# Patient Record
Sex: FEMALE | Race: WHITE | NOT HISPANIC OR LATINO | Employment: FULL TIME | URBAN - METROPOLITAN AREA
[De-identification: names, ages, dates, MRNs, and addresses within clinical notes are randomized per-mention and may not be internally consistent; named-entity substitution may affect disease eponyms.]

---

## 2017-03-01 ENCOUNTER — MEDICAL CORRESPONDENCE (OUTPATIENT)
Dept: HEALTH INFORMATION MANAGEMENT | Facility: CLINIC | Age: 37
End: 2017-03-01

## 2017-03-07 ENCOUNTER — PRE VISIT (OUTPATIENT)
Dept: CARDIOLOGY | Facility: CLINIC | Age: 37
End: 2017-03-07

## 2017-03-07 DIAGNOSIS — E78.00 HIGH CHOLESTEROL: Primary | ICD-10-CM

## 2017-03-07 NOTE — TELEPHONE ENCOUNTER
Pre visit nursing summary    HPI: Gaviota REDD Yanivpurnima was referred to clinic for High Cholesterol    Labs on 11/18/2016  Chol-285  HDL-83  C-LDL-193  Trig-48

## 2017-03-08 ENCOUNTER — OFFICE VISIT (OUTPATIENT)
Dept: CARDIOLOGY | Facility: CLINIC | Age: 37
End: 2017-03-08
Attending: INTERNAL MEDICINE
Payer: COMMERCIAL

## 2017-03-08 VITALS
WEIGHT: 122 LBS | BODY MASS INDEX: 20.83 KG/M2 | HEART RATE: 65 BPM | HEIGHT: 64 IN | SYSTOLIC BLOOD PRESSURE: 104 MMHG | DIASTOLIC BLOOD PRESSURE: 67 MMHG | OXYGEN SATURATION: 99 %

## 2017-03-08 DIAGNOSIS — E78.00 HIGH CHOLESTEROL: ICD-10-CM

## 2017-03-08 PROCEDURE — 93005 ELECTROCARDIOGRAM TRACING: CPT | Mod: ZF

## 2017-03-08 PROCEDURE — 93010 ELECTROCARDIOGRAM REPORT: CPT | Mod: ZP | Performed by: INTERNAL MEDICINE

## 2017-03-08 PROCEDURE — 99213 OFFICE O/P EST LOW 20 MIN: CPT | Performed by: INTERNAL MEDICINE

## 2017-03-08 RX ORDER — ATORVASTATIN CALCIUM 40 MG/1
40 TABLET, FILM COATED ORAL DAILY
Qty: 90 TABLET | Refills: 3 | Status: SHIPPED | OUTPATIENT
Start: 2017-03-08 | End: 2020-10-22

## 2017-03-08 ASSESSMENT — PAIN SCALES - GENERAL: PAINLEVEL: NO PAIN (0)

## 2017-03-08 NOTE — PROGRESS NOTES
2017            Lyubov Underwood MD   [address] [address]      RE: Gaviota Lopez   MRN: [id number]   : 1980      Dear Dr. Underwood:      It was a pleasure participating in the care of your patient, Ms. Gaviota Lopez.  As you know, she is a 36-year-old lady who I see today for hyperlipidemia.      PAST MEDICAL HISTORY:      1.  Hyperlipidemia.   2.  Allergic rhinitis.   3.  Irregular periods.   4.  Asthma.   5.  Depression/anxiety.   6.  Vitamin D deficiency.      She denies having a significant history of cardiac disease.      In terms of her present symptom complex, both her parents have high cholesterol and she wanted to seek an opinion regarding this.      In terms of her symptoms, she is quite active.  She works out 4-5 times a week.  She does 30 minutes of aerobic activity on the treadmill or elliptical machine and then followed by 30 minutes of weightlifting.  She denies having any significant exertional chest discomfort or exertional limitation.  She denies shortness of breath, exertional dyspnea, PND, orthopnea, edema, palpitations, syncope or near-syncope.  She is asymptomatic at a relatively high level of exertion and denies having any other significant complaints.      In terms of her cardiac risk factors, no history of diabetes or hypertension.  She does not smoke.  She drinks 1-2 drinks a month.  Parents both have hyperlipidemia but no documented coronary artery disease.  She does have hyperlipidemia herself.  She is a student studying genetic counseling.      MEDICATIONS:  In terms of her current medications, she is taking sertraline and vitamin D.      PHYSICAL EXAMINATION:     VITAL SIGNS:  Her blood pressure is 104/67 with a pulse 65.  Her weight is 122 pounds.   NECK:  Exam reveals no obvious jugular venous distention.   LUNGS:  Clear to auscultation.  Respiratory effort is normal.   CARDIAC:  Reveals a regular rate and  rhythm, no obvious murmur or gallop appreciated.   ABDOMEN:  Belly soft, nontender.   EXTREMITIES:  Without gross edema.      Labs 12/16/2016 potassium 4.6, GFR normal.  03/09/2016 hemoglobin 13.4.  11/18/2016 total cholesterol 285, , HDL 83, triglycerides 46.        EKG today normal sinus rhythm at a rate of 65 beats per minute, nonspecific T-wave changes.        IMPRESSION:      Gaviota is a 36-year-old lady without a prior documented history of cardiac disease who presents with hyperlipidemia.  Her LDL is 193 as of 11/18/2016, and according to current practice guidelines, further medical therapy would be indicated.        PLAN:     1.  Start Lipitor 40 mg a day.  She would like to follow up with you for further titration of her antihyperlipidemic regimen and will likely require another fasting lipid profile in 3-4 months.     2.  She can followup here on an as-needed basis or if further issues should arise.      Once again, it was a pleasure participating in the care of your patient, Ms. Gaviota Lopez.  Please feel free to contact me at anytime if you have questions regarding her care in the future.      Sincerely,      Tanvir Reddy MD

## 2017-03-08 NOTE — PATIENT INSTRUCTIONS
You were seen today in the Cardiovascular Clinic at the [facility].      Cardiology Providers you saw during your visit:  Dr. Verdin    Recommendations:  Start Lipitor 40 mg daily by mouth. Have your primary care provider order a repeat lipid profile lab test for for in 3-4 months.    Follow-up:  As needed with you primary care provider for refills as well.      Thank you for your visit today!       Please call if you have any questions or concerns.  Cardiology Care Coordinator  Josiane Purdy RN    For scheduling needs [phone] option 1 and the option 1 again.  Nursing questions: [phone] option 1 then chose option 3 for the triage nurse.  For emergencies call [phone].

## 2017-03-08 NOTE — MR AVS SNAPSHOT
After Visit Summary   3/8/2017    Gaviota Lopez    MRN: [id number]           Patient Information     Date Of Birth          1980        Visit Information        Provider Department      3/8/2017 8:30 AM Tanvir Verdin MD [facility]        Today's Diagnoses     High cholesterol          Care Instructions    You were seen today in the Cardiovascular Clinic at the [facility].      Cardiology Providers you saw during your visit:  Dr. Verdin    Recommendations:  Start Lipitor 40 mg daily by mouth. Have your primary care provider order a repeat lipid profile lab test for for in 3-4 months.    Follow-up:  As needed with you primary care provider for refills as well.      Thank you for your visit today!       Please call if you have any questions or concerns.  Cardiology Care Coordinator  Josiane Purdy RN    For scheduling needs [phone] option 1 and the option 1 again.  Nursing questions: [phone] option 1 then chose option 3 for the triage nurse.  For emergencies call [phone].                Follow-ups after your visit        Follow-up notes from your care team     Return if symptoms worsen or fail to improve, for CLAUDIA verdin.      Who to contact     If you have questions or need follow up information about today's clinic visit or your schedule please contact [facility] directly at [phone].  Normal or non-critical lab and imaging results will be communicated to you by MyChart, letter or phone within 4 business days after the clinic has received the results. If you do not hear from us within 7 days, please contact the clinic through MyChart or phone. If you have a critical or abnormal lab result, we will notify you by phone as soon as possible.  Submit refill requests through [employer] or call your pharmacy and they will forward the refill request to us. Please allow 3 business days for your refill to be completed.          Additional Information About Your  "Visit        myJambihart Information     [employer] lets you send messages to your doctor, view your test results, renew your prescriptions, schedule appointments and more. To sign up, go to [email or web address] . Click on \"Log in\" on the left side of the screen, which will take you to the Welcome page. Then click on \"Sign up Now\" on the right side of the page.     You will be asked to enter the access code listed below, as well as some personal information. Please follow the directions to create your username and password.     Your access code is: [email or web address]  Expires: 2017  6:31 AM     Your access code will  in 90 days. If you need help or a new code, please call your [facility] clinic or [phone].        Care EveryWhere ID     This is your Care EveryWhere ID. This could be used by other organizations to access your [facility] medical records  [id number]        Your Vitals Were     Pulse Height Pulse Oximetry BMI (Body Mass Index)          65 1.626 m (5' 4\") 99% 20.94 kg/m2         Blood Pressure from Last 3 Encounters:   17 104/67   16 113/68   03 126/66    Weight from Last 3 Encounters:   17 55.3 kg (122 lb)   16 55.2 kg (121 lb 9.6 oz)   03 49.9 kg (110 lb)              We Performed the Following     EKG 12-lead, tracing only (Future)          Today's Medication Changes          These changes are accurate as of: 3/8/17  8:53 AM.  If you have any questions, ask your nurse or doctor.               Start taking these medicines.        Dose/Directions    atorvastatin 40 MG tablet   Commonly known as:  LIPITOR   Used for:  High cholesterol        Dose:  40 mg   Take 1 tablet (40 mg) by mouth daily   Quantity:  90 tablet   Refills:  3            Where to get your medicines      These medications were sent to [facility] [place], [place] - [address] [place] [id number]     Phone:  [phone]     atorvastatin 40 MG tablet       "          Primary Care Provider    None       No address on file        Thank you!     Thank you for choosing [facility]  for your care. Our goal is always to provide you with excellent care. Hearing back from our patients is one way we can continue to improve our services. Please take a few minutes to complete the written survey that you may receive in the mail after your visit with us. Thank you!             Your Updated Medication List - Protect others around you: Learn how to safely use, store and throw away your medicines at www.disposemymeds.org.          This list is accurate as of: 3/8/17  8:53 AM.  Always use your most recent med list.                   Brand Name Dispense Instructions for use    atorvastatin 40 MG tablet    LIPITOR    90 tablet    Take 1 tablet (40 mg) by mouth daily       B-100 COMPLEX PO          VITAMIN D-3 PO          ZOLOFT 50 MG tablet   Generic drug:  sertraline      Take by mouth daily

## 2017-03-08 NOTE — LETTER
3/8/2017      RE: Gaviota Lopez  [address] [facility] [address]       Dear Colleague,    Thank you for the opportunity to participate in the care of your patient, Gaviota Lopez, at the [facility] HEART [facility]. Please see a copy of my visit note below.    2017            Lyubov Underwood MD   [address] [address]      RE: Gaviota Lopez   MRN: [id number]   : 1980      Dear Dr. Underwood:      It was a pleasure participating in the care of your patient, Ms. Gaviota Lopez.  As you know, she is a 36-year-old lady who I see today for hyperlipidemia.      PAST MEDICAL HISTORY:      1.  Hyperlipidemia.   2.  Allergic rhinitis.   3.  Irregular periods.   4.  Asthma.   5.  Depression/anxiety.   6.  Vitamin D deficiency.      She denies having a significant history of cardiac disease.      In terms of her present symptom complex, both her parents have high cholesterol and she wanted to seek an opinion regarding this.      In terms of her symptoms, she is quite active.  She works out 4-5 times a week.  She does 30 minutes of aerobic activity on the treadmill or elliptical machine and then followed by 30 minutes of weightlifting.  She denies having any significant exertional chest discomfort or exertional limitation.  She denies shortness of breath, exertional dyspnea, PND, orthopnea, edema, palpitations, syncope or near-syncope.  She is asymptomatic at a relatively high level of exertion and denies having any other significant complaints.      In terms of her cardiac risk factors, no history of diabetes or hypertension.  She does not smoke.  She drinks 1-2 drinks a month.  Parents both have hyperlipidemia but no documented coronary artery disease.  She does have hyperlipidemia herself.  She is a student studying genetic counseling.      MEDICATIONS:  In terms of her current  medications, she is taking sertraline and vitamin D.      PHYSICAL EXAMINATION:     VITAL SIGNS:  Her blood pressure is 104/67 with a pulse 65.  Her weight is 122 pounds.   NECK:  Exam reveals no obvious jugular venous distention.   LUNGS:  Clear to auscultation.  Respiratory effort is normal.   CARDIAC:  Reveals a regular rate and rhythm, no obvious murmur or gallop appreciated.   ABDOMEN:  Belly soft, nontender.   EXTREMITIES:  Without gross edema.      Labs 12/16/2016 potassium 4.6, GFR normal.  03/09/2016 hemoglobin 13.4.  11/18/2016 total cholesterol 285, , HDL 83, triglycerides 46.        EKG today normal sinus rhythm at a rate of 65 beats per minute, nonspecific T-wave changes.        IMPRESSION:      Gaviota is a 36-year-old lady without a prior documented history of cardiac disease who presents with hyperlipidemia.  Her LDL is 193 as of 11/18/2016, and according to current practice guidelines, further medical therapy would be indicated.        PLAN:     1.  Start Lipitor 40 mg a day.  She would like to follow up with you for further titration of her antihyperlipidemic regimen and will likely require another fasting lipid profile in 3-4 months.     2.  She can followup here on an as-needed basis or if further issues should arise.      Once again, it was a pleasure participating in the care of your patient, Ms. Gaviota Lopez.  Please feel free to contact me at anytime if you have questions regarding her care in the future.      Sincerely,      Tanvir Reddy MD

## 2017-03-09 LAB — INTERPRETATION ECG - MUSE: NORMAL

## 2017-06-16 ENCOUNTER — TRANSFERRED RECORDS (OUTPATIENT)
Dept: HEALTH INFORMATION MANAGEMENT | Facility: CLINIC | Age: 37
End: 2017-06-16

## 2017-10-13 ENCOUNTER — TRANSFERRED RECORDS (OUTPATIENT)
Dept: HEALTH INFORMATION MANAGEMENT | Facility: CLINIC | Age: 37
End: 2017-10-13

## 2017-10-17 ENCOUNTER — TRANSFERRED RECORDS (OUTPATIENT)
Dept: HEALTH INFORMATION MANAGEMENT | Facility: CLINIC | Age: 37
End: 2017-10-17

## 2018-01-03 ENCOUNTER — VIRTUAL VISIT (OUTPATIENT)
Dept: FAMILY MEDICINE | Facility: OTHER | Age: 38
End: 2018-01-03

## 2018-01-04 NOTE — PROGRESS NOTES
"Date:   Clinician: Brianne Farnsworth  Clinician NPI: [id number]  Patient: Gaviota Lopez  Patient : 1980  Patient Address: [address], [place] [address]  Patient Phone: [phone]  Visit Protocol: Eye conditions  Patient Summary:  Gaviota is a 37 year old (: 1980 ) female who initiated a Visit for conjunctivitis.  When asked the question \"Please sign me up to receive news, health information and promotions from [employer].\", Gaviota responded \"No\".    Images of her eye condition were uploaded.   Her symptoms started 3-6 days ago and affect both eyes. The symptoms consist of drainage coming from the eye(s), itchy eye(s), and eye redness.   Symptom details     Drainage: The color of the drainage coming out of her eye(s) is yellow. The drainage is thick and causes her eyelids to be stuck shut in the morning.    Itchiness: Gaviota does not have seasonal allergies or hay fever.     Denied symptoms include eyelid swelling, eye pain, bumps on the eyelid, and light sensitivity. Gaviota has not experienced a decrease in vision and does not have subconjunctival hemorrhage. She does not feel feverish.   In the past 2 weeks, she has had a cold or an ear infection.   Gaviota has not had a recent diagnosis of conjunctivitis. She also has not had a recent eye injury, foreign body in the eye(s), and eye surgery. It is not known if Gaviota has recently been exposed to someone with a red eye or an eye infection. She does not wear contact lenses. Gaviota has not ever been diagnosed with glaucoma.   Gaviota is not taking medication to treat her current symptoms.   Gaviota does not require proof of evaluation of her eye condition before returning to school, work, or .   Gaviota does not smoke or use smokeless tobacco.   She denies pregnancy and denies breastfeeding. She has menstruated in the past month.  MEDICATIONS:   Patient free text response: Buspar  , ALLERGIES:   "  Free text response: Yaron    Clinician Response:  Dear Gaviota,  Based on the information provided, you most likely have bacterial conjunctivitis, more commonly called pink eye.  I am prescribing:  Polymyxin B-trimethoprim (Polytrim) 10,000 units-1mg 1ml ophthalmic solution. Apply 1 drop into the affected eye(s) every 3 hours, up to 6 times a day for 7 days.  The medication I prescribed is an antibiotic medication. Infections can be caused by either bacteria or a virus, and often have similar symptoms, so it is possible that this is a viral infection. Antibiotics are only effective against bacterial infections, so when it is caused by a virus, the medication will not help symptoms improve or make it less contagious.  To reduce the spread of the eye infection, you should not use eye makeup until the infection has fully resolved, and be sure to wash your hands at least once per hour and avoid touching the eyes as much as possible.  The following will reduce the risk for future eye infections:     Frequent handwashing    Replace towels and washcloths daily    Do not share towels and washcloths with others    Replace eye makeup used while eyes were infected    Do not use anyone else's eye makeup     Follow up with your provider if you are taking your medication as directed and do not see any improvements after 2 days. Be seen earlier if you develop any new or worsening symptoms.  I am providing you with a promo code for a free Visit. This code will  in two weeks and may only be used once. Please redeem your free Visit by entering the following promo code on the payment screen: [id number]   Diagnosis: Bacterial conjunctivitis  Diagnosis ICD: H10.9  Prescription: polymyxin B/trimethoprim (Polytrim) 10,000 units-1mg 1ml ophthalmic solution 10 ml, 7 days supply. Apply 1 drop into the affected eye(s) every 3 hours up to 6 times a day for 7 days. Refills: 0, Refill as needed: no, Allow substitutions: yes  Pharmacy:  [facility]/pharmacy #[id number] - [phone] - [address], [place] [address]

## 2019-08-07 ENCOUNTER — TELEPHONE (OUTPATIENT)
Dept: PSYCHIATRY | Facility: CLINIC | Age: 39
End: 2019-08-07

## 2019-10-09 ENCOUNTER — TRANSFERRED RECORDS (OUTPATIENT)
Dept: HEALTH INFORMATION MANAGEMENT | Facility: CLINIC | Age: 39
End: 2019-10-09

## 2019-10-10 ENCOUNTER — OFFICE VISIT (OUTPATIENT)
Dept: FAMILY MEDICINE | Facility: CLINIC | Age: 39
End: 2019-10-10
Payer: COMMERCIAL

## 2019-10-10 VITALS
HEART RATE: 52 BPM | WEIGHT: 114.6 LBS | SYSTOLIC BLOOD PRESSURE: 101 MMHG | TEMPERATURE: 98 F | DIASTOLIC BLOOD PRESSURE: 59 MMHG | RESPIRATION RATE: 16 BRPM | HEIGHT: 64 IN | BODY MASS INDEX: 19.56 KG/M2 | OXYGEN SATURATION: 100 %

## 2019-10-10 DIAGNOSIS — Z12.4 PAP SMEAR FOR CERVICAL CANCER SCREENING: ICD-10-CM

## 2019-10-10 DIAGNOSIS — Z00.00 ROUTINE GENERAL MEDICAL EXAMINATION AT A HEALTH CARE FACILITY: ICD-10-CM

## 2019-10-10 DIAGNOSIS — J45.20 MILD INTERMITTENT ASTHMA WITHOUT COMPLICATION: ICD-10-CM

## 2019-10-10 DIAGNOSIS — Z00.00 ROUTINE GENERAL MEDICAL EXAMINATION AT A HEALTH CARE FACILITY: Primary | ICD-10-CM

## 2019-10-10 DIAGNOSIS — Z13.220 SCREENING FOR HYPERLIPIDEMIA: ICD-10-CM

## 2019-10-10 LAB
ALBUMIN SERPL-MCNC: 4.2 G/DL (ref 3.4–5)
ALP SERPL-CCNC: 58 U/L (ref 40–150)
ALT SERPL W P-5'-P-CCNC: 20 U/L (ref 0–50)
ANION GAP SERPL CALCULATED.3IONS-SCNC: 8 MMOL/L (ref 3–14)
AST SERPL W P-5'-P-CCNC: 20 U/L (ref 0–45)
BASOPHILS # BLD AUTO: 0.1 10E9/L (ref 0–0.2)
BASOPHILS NFR BLD AUTO: 0.9 %
BILIRUB SERPL-MCNC: 0.5 MG/DL (ref 0.2–1.3)
BUN SERPL-MCNC: 22 MG/DL (ref 7–30)
CALCIUM SERPL-MCNC: 9 MG/DL (ref 8.5–10.1)
CHLORIDE SERPL-SCNC: 106 MMOL/L (ref 94–109)
CHOLEST SERPL-MCNC: 200 MG/DL
CO2 SERPL-SCNC: 24 MMOL/L (ref 20–32)
CREAT SERPL-MCNC: 0.86 MG/DL (ref 0.52–1.04)
DIFFERENTIAL METHOD BLD: ABNORMAL
EOSINOPHIL # BLD AUTO: 0.1 10E9/L (ref 0–0.7)
EOSINOPHIL NFR BLD AUTO: 1.8 %
ERYTHROCYTE [DISTWIDTH] IN BLOOD BY AUTOMATED COUNT: 13.8 % (ref 10–15)
GFR SERPL CREATININE-BSD FRML MDRD: 85 ML/MIN/{1.73_M2}
GLUCOSE SERPL-MCNC: 80 MG/DL (ref 70–99)
HCT VFR BLD AUTO: 40.4 % (ref 35–47)
HDLC SERPL-MCNC: 68 MG/DL
HGB BLD-MCNC: 12.6 G/DL (ref 11.7–15.7)
IMM GRANULOCYTES # BLD: 0 10E9/L (ref 0–0.4)
IMM GRANULOCYTES NFR BLD: 0.3 %
LDLC SERPL CALC-MCNC: 123 MG/DL
LYMPHOCYTES # BLD AUTO: 2.1 10E9/L (ref 0.8–5.3)
LYMPHOCYTES NFR BLD AUTO: 30 %
MCH RBC QN AUTO: 28.6 PG (ref 26.5–33)
MCHC RBC AUTO-ENTMCNC: 31.2 G/DL (ref 31.5–36.5)
MCV RBC AUTO: 92 FL (ref 78–100)
MONOCYTES # BLD AUTO: 0.4 10E9/L (ref 0–1.3)
MONOCYTES NFR BLD AUTO: 6.1 %
NEUTROPHILS # BLD AUTO: 4.3 10E9/L (ref 1.6–8.3)
NEUTROPHILS NFR BLD AUTO: 60.9 %
NONHDLC SERPL-MCNC: 133 MG/DL
NRBC # BLD AUTO: 0 10*3/UL
NRBC BLD AUTO-RTO: 0 /100
PLATELET # BLD AUTO: 287 10E9/L (ref 150–450)
POTASSIUM SERPL-SCNC: 3.6 MMOL/L (ref 3.4–5.3)
PROT SERPL-MCNC: 7.8 G/DL (ref 6.8–8.8)
RBC # BLD AUTO: 4.4 10E12/L (ref 3.8–5.2)
SODIUM SERPL-SCNC: 138 MMOL/L (ref 133–144)
SPECIMEN SOURCE: NORMAL
T4 FREE SERPL-MCNC: 1 NG/DL (ref 0.76–1.46)
TRIGL SERPL-MCNC: 49 MG/DL
TSH SERPL DL<=0.005 MIU/L-ACNC: 4.19 MU/L (ref 0.4–4)
WBC # BLD AUTO: 7 10E9/L (ref 4–11)
WET PREP SPEC: NORMAL

## 2019-10-10 RX ORDER — TOPIRAMATE 50 MG/1
50 TABLET, FILM COATED ORAL
COMMUNITY
Start: 2018-11-12 | End: 2020-01-07

## 2019-10-10 RX ORDER — BUSPIRONE HYDROCHLORIDE 30 MG/1
25 TABLET ORAL 2 TIMES DAILY
COMMUNITY
Start: 2019-09-04 | End: 2020-01-07

## 2019-10-10 ASSESSMENT — PAIN SCALES - GENERAL: PAINLEVEL: NO PAIN (0)

## 2019-10-10 ASSESSMENT — MIFFLIN-ST. JEOR: SCORE: 1179.82

## 2019-10-10 NOTE — PROGRESS NOTES
"   SUBJECTIVE:   CC: Gaviota Lopez is an 39 year old woman who presents for preventive health visit. Patient's pronouns: she/her/hers.    Concerns  Pt has been having yeast problems for x 6 months. She describes it as itching, lots of discharge, and smells \"yeasty\". She had tried OTC monitstat 3 day but it wasn't helpful, she tried it for 2 months. Her period comes every 5-6 weeks, lasts 4 days, and she usually gets the yeast infection mid-cycle. LMP 19, period is usually 4 days long, no heavy bleeding per patient.    Got influenza vaccination at work.        Healthy Habits:    Do you get at least three servings of calcium containing foods daily (dairy, green leafy vegetables, etc.)? yes    Amount of exercise or daily activities, outside of work: 6 times a week. Run, walk, swim.    Problems taking medications regularly Yes.    Medication side effects: No    Have you had an eye exam in the past two years? yes    Do you see a dentist twice per year? yes    Do you have sleep apnea, excessive snoring or daytime drowsiness?no    Sees psychiatrist - going through [facility] - will be finished soon and will be establishing with a psychiatrist here.  Declines any questions or concerns regarding.  Feels like her mood is stable.     Asthma - under good control - uses an  albuterol inhaler occasionally.   Requests a refill today.     Today's PHQ-2 Score:   PHQ-2 (  [employer]) 10/10/2019   Q1: Little interest or pleasure in doing things 0   Q2: Feeling down, depressed or hopeless 0   PHQ-2 Score 0     Abuse: Current or Past(Physical, Sexual or Emotional)- NO  Do you feel safe in your environment? YES    Social History     Tobacco Use     Smoking status: Never Smoker     Smokeless tobacco: Never Used   Substance Use Topics     Alcohol use: No     If you drink alcohol do you typically have >3 drinks per day or >7 drinks per week? No                     Reviewed orders with patient.  Reviewed health " "maintenance and updated orders accordingly - Yes  Lab work is in process    Mammogram not appropriate for this patient based on age.    Pertinent mammograms are reviewed under the imaging tab.  History of abnormal Pap smear: NO - age 30-65 PAP every 5 years with negative HPV co-testing recommended     Reviewed and updated as needed this visit by clinical staff  Tobacco  Allergies  Meds  Med Hx  Surg Hx  Fam Hx  Soc Hx      Reviewed and updated as needed this visit by Provider  Tobacco  Med Hx  Surg Hx  Fam Hx  Soc Hx       ROS:  CONSTITUTIONAL: NEGATIVE for fever, chills, change in weight  INTEGUMENTARY/SKIN: NEGATIVE for worrisome rashes, moles or lesions  EYES: NEGATIVE for vision changes or irritation  ENT: NEGATIVE for ear, mouth and throat problems  RESP: NEGATIVE for significant cough or SOB  BREAST: NEGATIVE for masses, tenderness or discharge  CV: NEGATIVE for chest pain, palpitations or peripheral edema  GI: NEGATIVE for nausea, abdominal pain, heartburn, or change in bowel habits  : NEGATIVE for unusual urinary symptoms. No vaginal bleeding. POSITIVE for itching and yeast-like symptom per patient.  MUSCULOSKELETAL: NEGATIVE for significant arthralgias or myalgia  NEURO: NEGATIVE for weakness, dizziness or paresthesias  PSYCHIATRIC: NEGATIVE for changes in mood or affect     OBJECTIVE:   /59 (BP Location: Right arm, Patient Position: Sitting, Cuff Size: Adult Regular)   Pulse 52   Temp 98  F (36.7  C) (Oral)   Resp 16   Ht 1.626 m (5' 4\")   Wt 52 kg (114 lb 9.6 oz)   SpO2 100%   BMI 19.67 kg/m    EXAM:  GENERAL: healthy, alert and no distress  EYES: Eyes grossly normal to inspection, PERRL and conjunctivae and sclerae normal  HENT: ear canals and TM's normal, nose and mouth without ulcers or lesions  NECK: no adenopathy, no asymmetry, masses, or scars and thyroid normal to palpation  RESP: lungs clear to auscultation - no rales, rhonchi or wheezes  BREAST: normal without masses, " tenderness or nipple discharge and no palpable axillary masses or adenopathy  CV: regular rate and rhythm, normal S1 S2, no S3 or S4, no murmur, click or rub, no peripheral edema and peripheral pulses strong  ABDOMEN: soft, nontender, no hepatosplenomegaly, no masses and bowel sounds normal   (female): normal female external genitalia, normal urethral meatus, vaginal mucosa pink, moist, well rugated, and normal cervix/adnexa/uterus without masses or discharge  MS: no gross musculoskeletal defects noted, no edema  SKIN: no suspicious lesions or rashes  NEURO: Normal strength and tone, mentation intact and speech normal  PSYCH: mentation appears normal, affect normal/bright    ASSESSMENT/PLAN:   Gaviota was seen today for physical.    Diagnoses and all orders for this visit:      ICD-10-CM    1. Routine general medical examination at a health care facility Z00.00 CBC with platelets differential     Lipid panel reflex to direct LDL Fasting     Comprehensive metabolic panel     TSH with free T4 reflex     Pap imaged thin layer screen with HPV - recommended age 30 - 65 years (select HPV order below)     HPV High Risk Types DNA Cervical     Wet prep     CANCELED: HIV Antigen Antibody Combo   2. Mild intermittent asthma without complication J45.20 albuterol (PROAIR RESPICLICK) 108 (90 Base) MCG/ACT inhaler   3. Screening for hyperlipidemia Z13.220 Lipid panel reflex to direct LDL Fasting     Comprehensive metabolic panel   4. Pap smear for cervical cancer screening Z12.4 Pap imaged thin layer screen with HPV - recommended age 30 - 65 years (select HPV order below)     HPV High Risk Types DNA Cervical       COUNSELING:   Reviewed preventive health counseling, as reflected in patient instructions       Treat and prevention of yeast infection by using 7d monistat OTC and hygiene before and after sex.    Estimated body mass index is 19.67 kg/m  as calculated from the following:    Height as of this encounter: 1.626 m (5'  "4\").    Weight as of this encounter: 52 kg (114 lb 9.6 oz).  She has never used smokeless tobacco.    Counseling Resources:  ATP IV Guidelines  Pooled Cohorts Equation Calculator  Breast Cancer Risk Calculator  FRAX Risk Assessment  ICSI Preventive Guidelines  Dietary Guidelines for Americans, 2010  USDA's MyPlate  ASA Prophylaxis  Lung CA Screening    Sabino Arvizu DNP-FNP Student    I was present with the NPP student who participated in the service and in the documentation of the services provided. I have verified the history and personally performed the physical exam and medical decision making, as documented by the student and edited by me    BRET Castro CNP  10/10/19  12:41 PM    BRET Castro, CNP  [facility]  "

## 2019-10-10 NOTE — NURSING NOTE
Chief Complaint   Patient presents with     Physical     Pt comes in for a physical exam.         BRENDA La on 10/10/2019 at 7:10 AM

## 2019-10-10 NOTE — PATIENT INSTRUCTIONS
Preventive Health Recommendations  Female Ages 26 - 39  Yearly exam:   See your health care provider every year in order to    Review health changes.     Discuss preventive care.      Review your medicines if you your doctor has prescribed any.    Until age 30: Get a Pap test every three years (more often if you have had an abnormal result).    After age 30: Talk to your doctor about whether you should have a Pap test every 3 years or have a Pap test with HPV screening every 5 years.   You do not need a Pap test if your uterus was removed (hysterectomy) and you have not had cancer.  You should be tested each year for STDs (sexually transmitted diseases), if you're at risk.   Talk to your provider about how often to have your cholesterol checked.  If you are at risk for diabetes, you should have a diabetes test (fasting glucose).  Shots: Get a flu shot each year. Get a tetanus shot every 10 years.   Nutrition:     Eat at least 5 servings of fruits and vegetables each day.    Eat whole-grain bread, whole-wheat pasta and brown rice instead of white grains and rice.    Get adequate Calcium and Vitamin D.   [facility] Medication Refill Request Information:  * Please contact your pharmacy regarding ANY request for medication refills.  ** NP Clinic Prescription Fax = [phone]  * Please allow 3 business days for routine medication refills.  * Please allow 5 business days for controlled substance medication refills.     [facility] Test Result notification information:  *You will be notified with in 7-10 days of your appointment day regarding the results of your test.  If you are on MyChart you will be notified as soon as the provider has reviewed the results and signed off on them.    [facility]: [phone]         Lifestyle    Exercise at least 150 minutes a week (30 minutes a day, 5 days of the week). This will help you control your weight and prevent  disease.    Limit alcohol to one drink per day.    No smoking.     Wear sunscreen to prevent skin cancer.    See your dentist every six months for an exam and cleaning.

## 2019-10-15 ENCOUNTER — TELEPHONE (OUTPATIENT)
Dept: FAMILY MEDICINE | Facility: CLINIC | Age: 39
End: 2019-10-15

## 2019-10-15 DIAGNOSIS — R79.89 ELEVATED TSH: Primary | ICD-10-CM

## 2019-10-15 NOTE — TELEPHONE ENCOUNTER
LDL Cholesterol Calculated   Date Value Ref Range Status   10/10/2019 123 (H) <100 mg/dL Final     Comment:     Above desirable:  100-129 mg/dl  Borderline High:  130-159 mg/dL  High:             160-189 mg/dL  Very high:       >189 mg/dl     01/06/2003 206 (H) <130 mg/dL Final       Recent Labs   Lab Test 10/10/19  0820   CHOL 200*   HDL 68   *   TRIG 49       Attempted to call patient. No answer.  Sent a [employer] message.  YG

## 2019-10-16 LAB
COPATH REPORT: NORMAL
PAP: NORMAL

## 2019-10-17 LAB
FINAL DIAGNOSIS: NORMAL
HPV HR 12 DNA CVX QL NAA+PROBE: NEGATIVE
HPV16 DNA SPEC QL NAA+PROBE: NEGATIVE
HPV18 DNA SPEC QL NAA+PROBE: NEGATIVE
SPECIMEN DESCRIPTION: NORMAL
SPECIMEN SOURCE CVX/VAG CYTO: NORMAL

## 2019-10-29 ENCOUNTER — HEALTH MAINTENANCE LETTER (OUTPATIENT)
Age: 39
End: 2019-10-29

## 2019-11-20 ENCOUNTER — TELEPHONE (OUTPATIENT)
Dept: PSYCHIATRY | Facility: CLINIC | Age: 39
End: 2019-11-20

## 2019-11-20 NOTE — TELEPHONE ENCOUNTER
On 11/18/2019 the patient signed an MICHAEL authorizing the release of all pertinent records  from [facility] and [facility] to [facility] for the purpose of continuing care.  I faxed this MICHAEL to [facility] [phone] and [facility] at [phone] on 11/20/2019, sent the original to scanning and held a copy in Psychiatry until scanning complete/confirmed ..Holly Steven LPN

## 2019-11-21 ENCOUNTER — TELEPHONE (OUTPATIENT)
Dept: PSYCHIATRY | Facility: CLINIC | Age: 39
End: 2019-11-21

## 2019-11-21 NOTE — TELEPHONE ENCOUNTER
On 11/21/2019, 79 pages of records were received from [facility]. This writer put a copy of the records in Pari Duffy's folder upfront and this was routed to Pari, please shred your copy when finished.  I sent the original documents to scanning on 11/21/2019 and held a copy in Psychiatry until scanning is confirmed. Jillian Lewis, CMA

## 2019-11-26 ENCOUNTER — TELEPHONE (OUTPATIENT)
Dept: PSYCHIATRY | Facility: CLINIC | Age: 39
End: 2019-11-26

## 2019-11-26 ENCOUNTER — OFFICE VISIT (OUTPATIENT)
Dept: PSYCHIATRY | Facility: CLINIC | Age: 39
End: 2019-11-26
Attending: NURSE PRACTITIONER
Payer: COMMERCIAL

## 2019-11-26 VITALS
HEART RATE: 64 BPM | DIASTOLIC BLOOD PRESSURE: 70 MMHG | BODY MASS INDEX: 19.74 KG/M2 | WEIGHT: 115 LBS | SYSTOLIC BLOOD PRESSURE: 110 MMHG

## 2019-11-26 DIAGNOSIS — F32.81 PMDD (PREMENSTRUAL DYSPHORIC DISORDER): Primary | ICD-10-CM

## 2019-11-26 DIAGNOSIS — R79.89 ELEVATED TSH: ICD-10-CM

## 2019-11-26 DIAGNOSIS — F50.9 EATING DISORDER, UNSPECIFIED TYPE: ICD-10-CM

## 2019-11-26 LAB
T4 FREE SERPL-MCNC: 0.95 NG/DL (ref 0.76–1.46)
TSH SERPL DL<=0.005 MIU/L-ACNC: 5.4 MU/L (ref 0.4–4)

## 2019-11-26 PROCEDURE — 84443 ASSAY THYROID STIM HORMONE: CPT | Performed by: NURSE PRACTITIONER

## 2019-11-26 PROCEDURE — 36415 COLL VENOUS BLD VENIPUNCTURE: CPT | Performed by: NURSE PRACTITIONER

## 2019-11-26 PROCEDURE — 84439 ASSAY OF FREE THYROXINE: CPT | Performed by: NURSE PRACTITIONER

## 2019-11-26 PROCEDURE — G0463 HOSPITAL OUTPT CLINIC VISIT: HCPCS | Mod: ZF

## 2019-11-26 ASSESSMENT — PAIN SCALES - GENERAL: PAINLEVEL: NO PAIN (0)

## 2019-11-26 NOTE — PROGRESS NOTES
"Outpatient Psychiatry Diagnostic Assessment       Gaviota Lopez is a 39 year old person assigned female at birth, identifies as cisgender female who uses the name Linda and pronoun tawanda, presenting for Diagnostic Assessment.     Therapist: None  PCP: No Ref-Primary, Physician  Other Providers: None  Referred by self for evaluation of PMDD and anxiety.     History was provided by patient who was a good historian.       Chief Complaint                                                                                                        \" ongoing medication management for PMDD. \"     History of Present Illness                                                                                4, 4     Pertinent Background:  Reports anxiety and depression started very young prior to menses starting. Menarche at 15 yo. Reports \"existential panic like why do I have this body?\" started as little as 5-7 yo.  Hx of nocturnal panic attack. Denies any trauma hx. Tried therapy on and off for years.  But medication trial did not start until about 2016 at [place].  Numerous medication trials without significant improvement and had Genesight done, but record not available today.  Also reports hx of bulimia and until recently was seen at [facility].  Had extensive work up for PMDD including endo consult and indicated very low estrogen levels. Had trial of continuous BECKY, but not effective.  Denies any SI, SIB or HI nor psychiatric hospitalization.    Most Recent History: Reports Buspar 25 mg BID, Topamax 100 mg daily for mood and eating and 25 mg daily PRN for anxiety which she takes x1-2/month.  These were prescribed by [facility] provider since 4/2018.  Tried Buspar 30 mg BID, but increased anxiety and reduced to 25 mg BID couple months ago.  Vistaril is prescribed, but rarely takes it last 2 years.    Reports both mood and anxiety difficulties occur 7-10 days prior to menses.  Menses continues to be irregular, lats usually 4 " days and has been mostly occurring q5 weeks.  This patterns have been present prior to disordered eating patterns.  Usually sleeps 10pm to 6am, but during menses, only sleeps 6 hours.  Reports frequent nightmares at least half of the week.  Outside of premenstrual symptoms, anxiety is mostly situational but experiences physiological symptoms anxiety that includes some shaking, but symptoms are fairly well managed with therapy and mindfulness technique.  Denies SI, SIB or HI.    Reports general patterns of illness has been depression leads to bulimia and anxiety.  Eating has been stable and has not had purging over a year currently.  Has been at [facility].    Denies any symptoms suggestive of hypomania or psychosis.    Medication current trials: Buspar, Topamax, Vistaril (rarely takes it)  Current Suicidality/Hx of Suicide Attempts: Denies both  CoCominent Medical concerns: Irregular menses      Medical Review of Systems      Apart from the symptoms mentioned int he HPI, the 14 point review of systems, including constitutional, HEENT, cardiovascular, respiratory, gastrointestinal, genitourinary, musculoskeletal, skin, endocrine, neurologic, hematologic and allergic is entirely negative except for irregular menses.     Pregnant: None. Nursing: None, Contraception: not currently sexually active      Past Psychiatric History     Past Diagnosis and Age of Onset: Depression:5-7 yo and Anxiety:5-7 yo, bulimia: in graduate school  Outpatient Programs [ DBT, Day Treatment, Eating Disorder Tx etc]- eating d/o tx at [facility]   Previous  admissions:  None  Previous providers:  [facility]  Current Therapist:  [facility]  Previous Psychiatric Meds: Ativan (helpful only when menstrual cycle can be predictable, also built tolerance over time), Beatris (mood exacerbation), Zoloft (not effective), Prozac (not effective), Trazodone (oversedation at 100 mg, not effective at 50mg), Pristiq (not effective), elavil (helped  sleep only), Vyvance (helpful for anxiety at 20 mg in AM, but sleep cycle worsening)  ECT: None  Suicidal ideation: None   Suicide Attempt: None  Most Recent- N/A  Self-injurious behavior: None  Violent behavior: None       Substance Use History     CAGE -AID completed and scanned to chart Score of 0.  Denies any use of alcohol currently.  Reports higher ETOH use during graduate school up to 2-3 beers/day daily.  In graduate school, had cannabis use occasionally, but no longer using it at all.      Past Medical/Surgical History      The patient s primary care provider is as listed in the medical record.    Allergies are listed in the medical record.       Prior hospitalization:  Past Surgical History:   Procedure Laterality Date     CLOSED RX CLAVICLE FRACTURE Right     Surgery      The patient reports no history of head injury.   The patient reports no history of loss of consciousness.   The patient reports no history of seizures.   The patient reports no history of of other neurological concerns.        Patient Active Problem List   Diagnosis     Amenorrhea     Current Outpatient Medications Ordered in Epic   Medication Sig Dispense Refill     albuterol (PROAIR RESPICLICK) 108 (90 Base) MCG/ACT inhaler Inhale 2 puffs into the lungs every 6 hours as needed for shortness of breath / dyspnea or wheezing 1 Inhaler 0     atorvastatin (LIPITOR) 40 MG tablet Take 1 tablet (40 mg) by mouth daily (Patient not taking: Reported on 10/10/2019) 90 tablet 3     busPIRone HCl (BUSPAR) 30 MG tablet Take 25 mg by mouth 2 times daily        topiramate (TOPAMAX) 50 MG tablet Take 50 mg by mouth daily       No current [facility]-ordered facility-administered medications on file.        Social History       The patient was raised in [place]. Grew up with 2 parents and a brother (-2), reports growing up feeling safe and stable.  Trauma history includes none.   The patient is single and has 0 children.    The patient s social support system  includes friends, family and therapist.  The patient lives alone and feels safe.    The patient completed high school and did not participate in special education classes. Post high school education includes completing post doc, PhD and also completed another master's degree after PhD program.  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    The patient has not had involvement with the legal system.   The patient has not served in the .   Access to Gun: None  The patient reports the following spiritual and/or cultural history related to care: None.  Finances are stable and basic needs are met.      Family History      Psychiatric:  Anxiety: B, M, maternal uncle, depression: PGM, schizophrenia: maternal cousin  Chemical Dependency:  ETOH: PGM, maternal uncles  Suicide:  Maternal uncle, multiple suicide attempts by brother during HS  Hereditary Major Medical:  HTN: MGM, MGF, DM: MGF, Cancer: basal cell: maternal uncle and M, Cardiac: MGF (80, cause of death), PGM (late),     Family History   Problem Relation Age of Onset     C.A.D. Paternal Grandfather      Cancer - colorectal Paternal Grandfather      Diabetes Maternal Grandfather      Hypertension Maternal Grandfather      Diabetes Maternal Uncle      Hypertension Maternal Uncle      No Known Problems Mother      No Known Problems Father      No Known Problems Brother         Allergy   Cephalosporins     Current Medications     Current Outpatient Medications   Medication Sig Dispense Refill     albuterol (PROAIR RESPICLICK) 108 (90 Base) MCG/ACT inhaler Inhale 2 puffs into the lungs every 6 hours as needed for shortness of breath / dyspnea or wheezing 1 Inhaler 0     atorvastatin (LIPITOR) 40 MG tablet Take 1 tablet (40 mg) by mouth daily (Patient not taking: Reported on 10/10/2019) 90 tablet 3     busPIRone HCl (BUSPAR) 30 MG tablet Take 25 mg by mouth 2 times daily        topiramate (TOPAMAX) 50 MG tablet Take 50 mg by  "mouth daily          Vitals                                                                                                                        3, 3     Vitals:    11/26/19 0913   BP: 110/70   Pulse: 64   Weight: 52.2 kg (115 lb)        Mental Status Exam                                                                                   9, 14 cog        Alertness: alert  and oriented  Appearance:  Casually dressed and Well groomed  Behavior/Demeanor: cooperative, pleasant and calm, with good  eye contact   Speech: regular rate and rhythm. Soft speech  Mood :  \"okay\"  Affect: full range and appropriate, slightly subdued; was congruent to mood; was congruent to content  Thought Process (Associations):  Logical, Linear and Goal directed  Thought process (Rate):  Normal  Thought content:  no overt psychosis, denies suicidal ideation, intent or thoughts and patient does not appear to be responding to internal stimuli  Perception:  Reports none;  Denies auditory hallucinations, visual hallucinations, depersonalization and derealization  Attention/Concentration:  Normal  Memory:  Immediate recall intact, Short-term memory intact and Long-term memory intact  Language: intact  Fund of Knowledge/Intelligence:  Above average  Abstraction:  Normal  Insight:  Good  Judgment:  Good  Cognition: (6) does  appear grossly intact; formal cognitive testing was not done    Physical Exam     Motor activity/EPS:  Normal  Gait:  Normal  Psychomotor: normal or unremarkable    Labs and Results      Pertinent findings on review include: Review of records with relevant information reported in the HPI.  Reviewed pt's past medical record and obtained collateral information.    [place] PRESCRIPTION MONITORING PROGRAM [] was checked today:  indicates no refills last 12 months.    PHQ9 Today:  8  No flowsheet data found.      Recent Labs   Lab Test 10/10/19  0820   CR 0.86   GFRESTIMATED 85     Recent Labs   Lab Test 10/10/19  0820   AST 20   ALT " 20   ALKPHOS 58       PSYCHOTROPIC DRUG INTERACTIONS:    no.  MANAGEMENT:  N/A    Impression/Assessment      Gaviota Lopez is a 39 year old adult  who presents for diagnostic assessment and establishment of care.  Pt was initially looking for PMDD specialist and aware that this writer is not PMDD specialist.  Intake contacted pt and provided pt to be on waitlist for women's clinic consult for consult but will return to continue long term medication management with this writer.  However, pt reports she has not heard when her women's clinic consult is.    Pt appears mostly stable in her mood and anxiety, denies SI, SIB or HI.  However, currently, pt's menstrual cycle is not at where she experiences the symptoms usually.  Pt reports currently her symptoms are fairly well managed with infrequent Topamax PRN use in addition to 100 mg daily dose.  She had recent decrease in Buspar dose as 30 mg BID made anxiety worse.  Since pt appears fairly stable in her condition and had recent changes in Buspar dose, without Genesight results, pt chose not to make any medication changes today.  Pt will bring or fax her Genesight results prior to next visit.  Discussed to follow up in 1 month at this time to possibly go over Genesight result and she may have women's health consult by then.  Pt may follow up with this writer after a consult for long term medication management.  Pt also had signed MICHAEL for [facility] prior to the visit, but information has not received at this time, will await for the information.    Diagnosis                                                                   PMDD  Unspecified eating d/o.  Hx dx of JERONIMO    Treatment Recommendation & Plan       Medication Ordered/Consults/Labs/tests Ordered:     Medication: continue current medication regimen  OTC Recommendations: none  Lab Orders:  none  Referrals: none, pt is on waitlist for women's health consult  Release of Information: already signed for  eliecer  Future Treatment Considerations: per symptoms.   Return for Follow Up: in 4 weeks    -Discussed safety plan for suicidal thoughts  -Discussed plan for suicidality  -Discussed available emergency services  -Patient agrees with the treatment plan  -Encouraged to continue outpatient therapy to gain more coping mechanism for stress.    Treatment Risk Statement: Discussed with the patient my impressions, as well as recommended studies. I educated patient on the differential diagnosis and prognosis. I discussed with the patient the risks and benefits of medications versus no interventions, including efficacy, dose, possible side effects and length of treatment and the importance of medication compliance.  The patient understands the risks, benefits, adverse effects and alternatives. Agrees to treatment with the capacity to do so. No medical contraindications to treatment. The patient also understands the risks of using street drugs or alcohol.     CRISIS NUMBERS:   Provided routinely in AVS.    Pari Duffy CNP,  11/26/2019

## 2019-11-27 ENCOUNTER — TELEPHONE (OUTPATIENT)
Dept: PSYCHIATRY | Facility: CLINIC | Age: 39
End: 2019-11-27

## 2019-11-27 NOTE — TELEPHONE ENCOUNTER
On 11/27/2019, 18 pages of Gene Sight test results from 2017 was received. The original copies were put in Pari Duffy's folder. A note was routed to provider to forward copies to scanning.Holly Steven LPN

## 2019-12-03 ENCOUNTER — TELEPHONE (OUTPATIENT)
Dept: PSYCHIATRY | Facility: CLINIC | Age: 39
End: 2019-12-03

## 2019-12-03 NOTE — TELEPHONE ENCOUNTER
On 12/2/2019, 78 faxed pages of records was received from [facility]. The original copies were sent to scanning and copies were put in Pari Duffy's folder.Holly Steven LPN

## 2019-12-05 ASSESSMENT — PATIENT HEALTH QUESTIONNAIRE - PHQ9: SUM OF ALL RESPONSES TO PHQ QUESTIONS 1-9: 8

## 2019-12-10 DIAGNOSIS — E03.8 SUBCLINICAL HYPOTHYROIDISM: Primary | ICD-10-CM

## 2019-12-12 ENCOUNTER — TELEPHONE (OUTPATIENT)
Dept: PSYCHIATRY | Facility: CLINIC | Age: 39
End: 2019-12-12

## 2019-12-12 NOTE — TELEPHONE ENCOUNTER
On 12/12/2019, 57 pages of records were received from [facility]. This writer put a copy of the records in Pari Duffy's folder upfront and this was routed to Pari, please shred your copy when finished.  I sent the original documents to scanning on 12/12/2019 and held a copy in Psychiatry until scanning is confirmed. Jillian Lewis, CMA

## 2020-01-07 ENCOUNTER — OFFICE VISIT (OUTPATIENT)
Dept: PSYCHIATRY | Facility: CLINIC | Age: 40
End: 2020-01-07
Attending: NURSE PRACTITIONER
Payer: COMMERCIAL

## 2020-01-07 VITALS
SYSTOLIC BLOOD PRESSURE: 119 MMHG | HEART RATE: 68 BPM | BODY MASS INDEX: 20.08 KG/M2 | WEIGHT: 117 LBS | DIASTOLIC BLOOD PRESSURE: 75 MMHG

## 2020-01-07 DIAGNOSIS — F50.9 EATING DISORDER, UNSPECIFIED TYPE: ICD-10-CM

## 2020-01-07 DIAGNOSIS — F32.81 PMDD (PREMENSTRUAL DYSPHORIC DISORDER): Primary | ICD-10-CM

## 2020-01-07 PROCEDURE — G0463 HOSPITAL OUTPT CLINIC VISIT: HCPCS | Mod: ZF

## 2020-01-07 RX ORDER — BUSPIRONE HYDROCHLORIDE 15 MG/1
TABLET ORAL
Qty: 180 TABLET | Refills: 0 | Status: SHIPPED | OUTPATIENT
Start: 2020-01-07 | End: 2020-04-28

## 2020-01-07 RX ORDER — BUSPIRONE HYDROCHLORIDE 10 MG/1
TABLET ORAL
Qty: 180 TABLET | Refills: 0 | Status: SHIPPED | OUTPATIENT
Start: 2020-01-07 | End: 2020-04-28

## 2020-01-07 RX ORDER — TOPIRAMATE 50 MG/1
50 TABLET, FILM COATED ORAL
Qty: 225 TABLET | Refills: 0 | Status: SHIPPED | OUTPATIENT
Start: 2020-01-07 | End: 2020-04-28

## 2020-01-07 ASSESSMENT — PAIN SCALES - GENERAL: PAINLEVEL: NO PAIN (0)

## 2020-01-07 NOTE — PROGRESS NOTES
Psychiatry Clinic Progress Note                                                                  Patient Name: Gaviota Lopez  YOB: 1980  MRN: [id number]  Date of Service:  1/7/2020  Last Seen:11/26/2019    Gaviota Lopez is a 39 year old gender queer person who uses the name Linda and pronoun tawanda.      Gaviota Lopez is a 39 year old year old adult who presents for ongoing psychiatric care.  Gaviota Lopez was last seen in clinic on 11/26/2019.     At that time,     Medication Ordered/Consults/Labs/tests Ordered:      Medication: continue current medication regimen  OTC Recommendations: none  Lab Orders:  none  Referrals: none, pt is on waitlist for women's health consult  Release of Information: already signed for eliecer  Future Treatment Considerations: per symptoms.   Return for Follow Up: in 4 weeks      Pertinent Background:  Diagnoses include PMDD, anxiety, depression.  Psych critical item history includes mutiple psychotropic trials and eating disorder (Bullimia).     Previous Psychiatric Meds: Ativan (helpful only when menstrual cycle can be predictable, also built tolerance over time), Beatris (mood exacerbation), Zoloft (not effective), Prozac (not effective), Trazodone (oversedation at 100 mg, not effective at 50mg), Pristiq (not effective), elavil (helped sleep only), Vyvance (helpful for anxiety at 20 mg in AM, but sleep cycle worsening), Buspar (increased anxiety at 30 mg BID)    Interim History                                                                                                        4, 4     Since the last visit, pt reports she is currently in middle of luteal phase where usually her PMDD symptoms are better than other luteal phase days.  Reports it's difficult to see patterns, but most of cycles, initial phase of luteal phase, depression and anxiety symptoms are exacerbating, middle phase is better than initial phase, end part of luteal phase  "may be exacerbating again or improving, depending on the cycle. Difficult to track ovulation due to irregular menstrual cycle. Taking PRN Topamax 25 mg daily in AM during the luteal phase, but due to oversedation, feels this is not sustainable.  When she takes PRN Topamax, goes to bed after work around 5pm, then wakes up and have difficulties going back to sleep at night.  Denies SI, SIB or HI.  BUllimia has not returned.    Also reports pt identifies as gender queer and is not prescribed to binary female gender, but has not considered hormone care to transition.  States \"if I'm all alone, I would do it, but with family, personal relationships and work, I am not ready to make that change.\"  Pt however may consider hormone care if PMDD symptoms would improve.  Pt now reports unsure if this is PMDD, gender dysphoria or dysphoria to menses due to PMDD.    Pt's TSH was 5.40 on 11/26/2019, PCP wanted to recheck in 6 months without any pharmacological intervention at this time.    Denies any symptoms suggestive of hypomania or psychosis.    Current Suicidality/Hx of Suicide Attempts: Denies both  CoCominent Medical concerns: Irregular menses    Medication Side Effects: The patient denies all medication side effects.      Medical Review of Systems     Apart from the symptoms mentioned int he HPI, the 14 point review of systems, including constitutional, HEENT, cardiovascular, respiratory, gastrointestinal, genitourinary, musculoskeletal, integumentary, endocrine, neurological, hematologic and allergic is entirely negative except for irregular menses.    Pregnant: None. Nursing: None, Contraception: not currently sexually active    Substance Use   Denies any substance use.     Medical / Surgical History                                                                                                                  Patient Active Problem List   Diagnosis     Amenorrhea       Past Surgical History:   Procedure Laterality Date " "    CLOSED RX CLAVICLE FRACTURE Right     Surgery        Social/ Family History                                  [per patient report]                                 1ea,1ea     Living arrangements: lives alone and feels safe  Social Support: friends, family and therapist  Access to gun: denies    Allergy                                Cephalosporins    Current Medications                                                                                                       Current Outpatient Medications   Medication Sig Dispense Refill     albuterol (PROAIR RESPICLICK) 108 (90 Base) MCG/ACT inhaler Inhale 2 puffs into the lungs every 6 hours as needed for shortness of breath / dyspnea or wheezing 1 Inhaler 0     atorvastatin (LIPITOR) 40 MG tablet Take 1 tablet (40 mg) by mouth daily (Patient not taking: Reported on 10/10/2019) 90 tablet 3     busPIRone HCl (BUSPAR) 30 MG tablet Take 25 mg by mouth 2 times daily        topiramate (TOPAMAX) 50 MG tablet Take 50 mg by mouth daily           Vitals                                                                                                                       3, 3     Vitals:    01/07/20 1557   BP: 119/75   Pulse: 68   Weight: 53.1 kg (117 lb)        Mental Status Exam                                                                                   9, 14 cog        Alertness: alert  and oriented  Appearance:  Casually dressed and Well groomed  Behavior/Demeanor: cooperative and calm, with good  eye contact   Speech: regular rate and rhythm  Mood :  \"up and down, but ok\"  Affect: slightly subdued and anxious; was congruent to mood; was congruent to content  Thought Process (Associations):  Logical, Linear and Goal directed  Thought process (Rate):  Normal  Thought content:  no overt psychosis, denies suicidal ideation, intent or thoughts and patient does not appear to be responding to internal stimuli  Perception:  Reports none;  Denies depersonalization and " derealization  Attention/Concentration:  Normal  Memory:  Immediate recall intact and Short-term memory intact  Language: intact  Fund of Knowledge/Intelligence:  Above average  Abstraction:  Normal  Insight:  Good  Judgment:  Good  Cognition: (6) does  appear grossly intact; formal cognitive testing was not done    Physical Exam     Motor activity/EPS:  Normal  Gait:  Normal  Psychomotor: normal or unremarkable    Labs and Results      Pertinent findings on review include: Review of records with relevant information reported in the HPI.  Reviewed pt's past medical record and obtained collateral information.      [place] PRESCRIPTION MONITORING PROGRAM [] was checked today:  indicates no refills last 12 months.    PHQ9 Today:  14  PHQ-9 SCORE 11/26/2019   PHQ-9 Total Score 8       Recent Labs   Lab Test 10/10/19  0820   CR 0.86   GFRESTIMATED 85     Recent Labs   Lab Test 10/10/19  0820   AST 20   ALT 20   ALKPHOS 58       PSYCHOTROPIC DRUG INTERACTIONS:    no.  MANAGEMENT:  N/A    Impression/Assessment      Gaviota Lopez is a 39 year old adult  who presents for med management follow up.  Pt appears slightly depressed and anxious than last seen, denies SI, SIB or HI. However, pt reports today is better days than last couple days as she is in premenstrual phase where she usually experiences significant depression and anxiety.  Has not tolerated Topamax 25 mg daily PRN due to oversedation.  However, pt wants to continue on current medication regimen until she sees PMDD specialist for consult as she knows that this phase would pass.  Reiterated [facility] review from previous providers of possible typical use of Paxil or Trintellix.  Pt also discussed gender identity today and has not thought about PMDD symptoms possibly as gender dysphoria vs menses dysphoria due to PMDD.  Has not thought of hormone care, but may be open if symptoms may improve.  At this time, pt wants to continue on current medication regimen  until PMDD specialist consult.  This writer sent a message to intake and [facility] consult availability and if there is more than 2 months wait, pt wants this writer to consult providers at [facility] to explore possible medication recommendations.  If pt can be seen by P within 2 months, pt would have consult with them and return to this provider for ongoing management.      Diagnosis                                                                   PMDD  Unspecified eating d/o.  Hx dx of JERONIMO    Treatment Recommendation & Plan       Medication Ordered/Consults/Labs/tests Ordered:     Medication: continue on current medication regimen for now  OTC Recommendations: none  Lab Orders:  none  Referrals: none, already on waitlist for [facility]   Release of Information: none  Future Treatment Considerations: Per symptoms. Trintellix or Paxil per "[employer] typical use  Return for Follow Up: in 2 months or if [facility] appiontment available, to see them for consult and return to this writer for ongoing management    -Discussed safety plan for suicidal thoughts  -Discussed plan for suicidality  -Discussed available emergency services  -Patient agrees with the treatment plan  -Encouraged to continue outpatient therapy to gain more coping mechanism for stress.    Treatment Risk Statement: Discussed with the patient my impressions, as well as recommended studies. I educated patient on the differential diagnosis and prognosis. I discussed with the patient the risks and benefits of medications versus no interventions, including efficacy, dose, possible side effects and length of treatment and the importance of medication compliance.  The patient understands the risks, benefits, adverse effects and alternatives. Agrees to treatment with the capacity to do so. No medical contraindications to treatment. The patient also understands the risks of using street drugs or alcohol.     CRISIS NUMBERS:   [facility]  Tati, PILAR,  1/7/2020

## 2020-01-07 NOTE — NURSING NOTE
Chief Complaint   Patient presents with     Recheck Medication     PMDD (premenstrual dysphoric disorder

## 2020-01-07 NOTE — PATIENT INSTRUCTIONS
-Continue on current medication regimen for now  -Pari would update women's health consult and inform you with next availability or recommendations

## 2020-01-15 ENCOUNTER — TRANSFERRED RECORDS (OUTPATIENT)
Dept: HEALTH INFORMATION MANAGEMENT | Facility: CLINIC | Age: 40
End: 2020-01-15

## 2020-01-29 ENCOUNTER — TRANSFERRED RECORDS (OUTPATIENT)
Dept: HEALTH INFORMATION MANAGEMENT | Facility: CLINIC | Age: 40
End: 2020-01-29

## 2020-04-09 ENCOUNTER — VIRTUAL VISIT (OUTPATIENT)
Dept: PSYCHIATRY | Facility: CLINIC | Age: 40
End: 2020-04-09
Attending: PSYCHIATRY & NEUROLOGY
Payer: COMMERCIAL

## 2020-04-09 DIAGNOSIS — F32.81 PMDD (PREMENSTRUAL DYSPHORIC DISORDER): Primary | ICD-10-CM

## 2020-04-09 ASSESSMENT — PAIN SCALES - GENERAL: PAINLEVEL: NO PAIN (0)

## 2020-04-09 NOTE — PROGRESS NOTES
Psychiatry Clinic Consult Note:  [facility]  [[facility]]   Gaviota Lopez is a  39 year old female referred by CHON Duffy for evaluation of possible PMDD  Partner/ Support: None  Children: None  Therapist: None  PCP: No Ref-Primary, Physician  OB-GYN:  None    Interim History                                                                                              4, 4     -She has a long-term history of depression and anxiety.  -She recently completed a year of OP treatment at [facility].  -She notes month to month variation in her cycle and mood.  -She notes when she has a longer duration between periods (5 weeks vs 4 weeks).  -She notes symptom onset typically 3-10 days prior to onset of menses. She notes anxiety, chest tightness, racing thoughts, irritability, anger, excessive crying, panic attacks, depression, lack of motivation, low energy, lack of motivation. She notes resolution around 2 days after onset of menses.   -She notes improvement in mood and anxiety after that.  -She notes that Buspar and Topamax have helped a lot.   -She notes excessive moodiness when she first had menses.  -She stopped menstruating for 5 days while she had an eating disorder. She notes that her symptoms did not improve. She was diagnosed with bulimia. She last had symptoms of bulimia two years ago.  -She denies depression apart from her period.   -She notes she tried OCP in 2016. She notes that this made her mood worse. This was the worst she ever felt. She felt terribly dysphoric.  -She notes gender dysphoria.  -She notes her anxiety is minimal when it is not around the time of her period. She feels overall well.    -Patient was started on Topamax for bulimia. It has helped a lot with her eating disorder and possibly anxiety.  -She has tried multiple agents, including SSRIs, SNRIs.   -She reports having nightmares almost every night. She notes that these worsened when taking Buspar and Topamax.  -She has not  tried Effexor.    Recent Symptoms:   Depression:  feeling hopeless and excessive crying  Elevated:  none  Psychosis:  none  Anxiety:  nervous/overwhelmed  Panic Attack:  chest tightness and palpitations  Trauma Related:  none  ADVERSE EFFECTS: None  MEDICAL CONCERNS: High TSH     Recent Substance Use: None    Reproductive Plans: None         Mood & Anxiety Disorder (PMAD) History   Hx of depression or anxiety during pregnancy: NA  Hx of postpartum mood or anxiety disorder: NA  Hx of  psychosis: NA  Hx premenstrual mood/anxiety problems: Yes  Hx mood symptoms while taking hormonal birth control: Yes  Hx of infertility: None  Hx of traumatic birth: None  Family Hx of PMADs: Unknown    Psychiatric History     Past Diagnosis and Age of Onset: Depression:5-5 yo and Anxiety:5-5 yo, bulimia: in graduate school  Outpatient Programs [ DBT, Day Treatment, Eating Disorder Tx etc]- eating d/o tx at [facility]   Previous  admissions:  None  Previous providers:  Haider Garces  Current Therapist:  [place]  Previous Psychiatric Meds: Ativan (helpful only when menstrual cycle can be predictable, also built tolerance over time), Beatris (mood exacerbation), Zoloft (not effective), Prozac (not effective), Trazodone (oversedation at 100 mg, not effective at 50mg), Pristiq (not effective), elavil (helped sleep only), Vyvance (helpful for anxiety at 20 mg in AM, but sleep cycle worsening)  ECT: None  Suicidal ideation: None   Suicide Attempt: None  Most Recent- N/A  Self-injurious behavior: None  Violent behavior: None    Social/ Family History                                  [per patient report]                          1ea,1ea   The patient was raised in [place]. Grew up with 2 parents and a brother (-2), reports growing up feeling safe and stable.  Trauma history includes none.   The patient is single and has 0 children.    The patient s social support system includes friends, family and therapist.  The  patient lives alone and feels safe.    The patient completed high school and did not participate in special education classes. Post high school education includes completing post doc, PhD and also completed another master's degree after PhD program.  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    The patient has not had involvement with the legal system.   The patient has not served in the .   Access to Gun: None  The patient reports the following spiritual and/or cultural history related to care: None.  Finances are stable and basic needs are met.    Medical / Surgical History                                                                                                                  Patient Active Problem List   Diagnosis     Amenorrhea       Past Surgical History:   Procedure Laterality Date     CLOSED RX CLAVICLE FRACTURE Right     Surgery     Medical Review of Systems                                                                                         2,10   Denies HA, fever, chills, SOB, chest pain, abdominal pain.  Allergy                                Cephalosporins  Current Medications                                                                                                       Current Outpatient Medications   Medication Sig Dispense Refill     albuterol (PROAIR RESPICLICK) 108 (90 Base) MCG/ACT inhaler Inhale 2 puffs into the lungs every 6 hours as needed for shortness of breath / dyspnea or wheezing 1 Inhaler 0     busPIRone (BUSPAR) 10 MG tablet Take 1 tab 2 times a day together with one 15 mg to make total of 25 mg 2 times a day 180 tablet 0     busPIRone (BUSPAR) 15 MG tablet Take 1 tab 2 times a day together with one 10 mg tablet to make total of 25 mg 2 times a day 180 tablet 0     topiramate (TOPAMAX) 50 MG tablet Take 1 tablet (50 mg) by mouth 2 times daily 25 mg as needed. 225 tablet 0     atorvastatin (LIPITOR) 40 MG tablet Take 1  "tablet (40 mg) by mouth daily (Patient not taking: Reported on 10/10/2019) 90 tablet 3     Vitals                                                                                                                            There were no vitals taken for this visit.   Mental Status Exam                                                                              9, 14 cog gs     Appearance: Alert, oriented, dressed in hospital scrubs, appears stated age   Attitude: Cooperative   Eye Contact: Good  Mood: \"Fine\"  Affect: Full range of affect  Speech: Normal rate and rhythm   Psychomotor Behavior: No tremor, rigidity, or psychomotor abnormality   Thought Process: Logical, goal directed   Associations: No loose associations   Thought Content: Denies SI or plan. No SIB. Denies A/V hallucinations, in addition to delusions.   Insight: Good  Judgment: Good  Oriented to: Person, place, and time  Attention Span and Concentration: Intact  Recent and Remote Memory: Intact  Language: English with appropriate syntax and vocabulary  Fund of Knowledge: Average  Muscle Strength and Tone: Grossly normal  Gait and Station: Grossly normal    Labs and Data                                                                                                               PHQ9 Today:  NA  PHQ 2019   PHQ-9 Total Score 8   Q9: Thoughts of better off dead/self-harm past 2 weeks Not at all     Recent Labs   Lab Test 10/10/19  0820   CR 0.86   GFRESTIMATED 85     Recent Labs   Lab Test 10/10/19  0820   AST 20   ALT 20   ALKPHOS 58       Diagnostic Impressions                                                                              1. PMDD  2. Unspecified anxiety disorder  3. History of panic disorder  4. History of bulimia  5. Gender dysphoria     Assessment                                                                      Gaviota Lopez is a  39 year old female referred by CHON Duffy for evaluation of PMDD. Patient meets " criteria for this condition with co-occurring anxiety disorder currently that does not meet criteria for any of the DSM anxiety disorders, hence the unspecified diagnosis. Her history is notable for bulimia and panic disorder that have improved with outpatient treatment and medications. Her current regimen is satisfactory to control her anxiety outside of the luteal phase of her menstrual cycle. Around the start of menses, she notices symptom onset of mood symptoms that are not controlled by her current regimen. As a result, she warrants additional treatment.    In terms of treatment, recommend multi-modal approach with lifestyle changes, nutraceuticals, and pharmacotherapy options as detailed below. Patient will trial Magnesium and discuss with her primary psychiatrist regarding the medication options below.     Plan                                                                                                              m2, h3     1) MEDICATION RECOMMENDATIONS:   - Continue current medications as they are therapeutic  - Avoid re-trial of OCPs given severe reaction prior  - Recommend use of another serotonergic agent to address PMDD with daily dosing instead of pulsed dosing (only during symptomatic time around period) which can be guided by past GeneSight testing:   A. Trintellix would likely be the best option if the patient's insurance covers, given lack of previous response to SSRIs/SNRIs. Start with 10 mg and then    Increase to 20 mg if no or partial response.   B. If this is not an option, recommend Effexor given evidence in treating PMDD. Start at 75 mg and increase to up to 250 mg if no or partial response.    C. If these options fail or are not preferred, trial Paxil. Start at 10 mg at bedtime and then increase to up to 60 mg if no or partial response.   D. Consider Seroquel (with or without addition of antidepressant) with dosing of 12.5 mg-25 mg BID prn anxiety associated with PMDD as there is some  "evidence base of benefit.      2) THERAPY: Continue intermittent therapy    3) FOLLOW-UP: with primary provider. The [facility] team is available to support your primary provider moving forward and they are welcome to re-refer to our team for medication adjustments as needed. The [facility] will coordinate care and relay our recommendations with your primary provider.    4) LABS: Patient to follow up with PCP concerning subclinical hypothyroidism as this may be affecting her mood.    5) PATIENT RECOMMENDATIONS:  -Exercise during symptom onset. Cardio is preferred with strong evidence for this being effective.  -Increase complex carbohydrate intake when symptoms begin. Reduce sugar and sodium intake.  -Avoid alcohol and caffeine as best able, especially during symptomatic periods.   -Trial per interest the following supplements:  - Magnesium 400-800mg/day  - Calcium 1200-1600mg/day  - Vit B6 50-100mg/day  - Chasteberry   -To track cycles: http://checkupfromtheWanderflyup.ca/wp-content/uploads/[email or web address]/02/drsp_m  -For additional information: https://womenentalhealth.org/specialty-clinics/pms-and-pmdd/    6) RESOURCES:  -To find additional local resources, refer to Postpartum Support International (PSI). Available at: http://www.postpartum.net/get-help/locations/united-states/  -LactMed is a good source for information on medications in breastfeeding. Available at: https://www.toxnet.nlm.nih.gov/newtoxnet/lactmed.htm  -[facility] at: www.womenentalhealth.org is a good resource for information about psychiatric medication in pregnancy/lactation  -Mother To Baby A service of the nonprofit [employer] of [employer] (FARZANA), provides evidence based information online and in printable handouts to provide patients and providers regarding medications and other exposures during pregnancy and lactation Available at: https://mothertobaby.org/fact-sheets-parent/.  -Consider using \"The Pregnancy " "and Postpartum Anxiety Workbook,\" by Lucy Stoll PhD and Ricki Middleton PsyD.    7) CRISIS NUMBERS:   Provided routinely in AVS.  Pregnancy & Postpartum Support [place] (PPSM) Helpline: [phone] (Call or Text)    The r/b/a of these medications in lactation/pregnancy were previously discussed with Gaviota Lopez and reviewed again today. The general r/b/a of treatment and medications were also reviewed today. We also previously reviewed the risks of untreated  mood and anxiety disorders to both mother and baby/families. We reviewed SE as well as general signs/symptoms in her breastfeeding child to monitor for. She understands she is to arrange assistance with childcare while taking medications that may cause sedation. Gaviota Lopez has had her questions answered, expresses her understanding of the information provided, and appears to have the capacity to give informed consent regarding treatment options.  resources were provided to the patient as above and as outlined in AVS.    STATEMENTS REGARDING CONSULT PROCESS      STATEMENT REGARDING CONSULT:  Intervention decisions emerging from this consult will be either made by the PCP/OB-GYN/Outpatient psychiatrist or initiated today in agreement with primary provider.  Note, this is a one time consult only.  No psychiatry follow-up will be provided. Primary provider is encouraged to contact this consultant if future assistance is desired.    COUNSELING AND COORDINATION OF CARE CONSISTED OF:  Diagnosis, impressions, risk and benefits of treatment options, instructions for treatment and follow up and plan for additional supporting services.    TELEHEALTH REQUIRED ADD ON    Telemedicine Visit: The patient's condition can be safely assessed and treated via synchronous audio and visual telemedicine encounter.      Time: 9:30 AM- 10:30 AM    Reason for Telemedicine Visit: Social distancing due to global pandemic of COVID-19.    Originating Site " (Patient Location): Patient's home.    Distant Site (Provider Location): Provider Remote Setting.    Consent:  The patient/guardian has verbally consented to: the potential risks and benefits of telemedicine (video visit) versus in person care; bill my insurance or make self-payment for services provided; and responsibility for payment of non-covered services.     Mode of Communication: Video Conference via AmWell    As the provider I attest to compliance with applicable laws and regulations related to telemedicine.    PROVIDER:  Jason Santo,     TELEHEALTH ATTENDING ATTESTATION  Following the ACGME guidelines on telemedicine and direct supervision due to COVID-19, I was concurrently participating in and/or monitoring the patient care through appropriate telecommunication technology.  I discussed the key portions of the service with the resident, including the mental status examination and developing the plan of care. I reviewed key portions of the history with the resident. I agree with the findings and plan as documented in this note.   Deneen De Souza MD

## 2020-04-28 ENCOUNTER — VIRTUAL VISIT (OUTPATIENT)
Dept: PSYCHIATRY | Facility: CLINIC | Age: 40
End: 2020-04-28
Attending: NURSE PRACTITIONER
Payer: COMMERCIAL

## 2020-04-28 DIAGNOSIS — F32.81 PMDD (PREMENSTRUAL DYSPHORIC DISORDER): Primary | ICD-10-CM

## 2020-04-28 DIAGNOSIS — F50.9 EATING DISORDER, UNSPECIFIED TYPE: ICD-10-CM

## 2020-04-28 RX ORDER — BUSPIRONE HYDROCHLORIDE 10 MG/1
TABLET ORAL
Qty: 180 TABLET | Refills: 0 | Status: SHIPPED | OUTPATIENT
Start: 2020-04-28 | End: 2020-08-11

## 2020-04-28 RX ORDER — BUSPIRONE HYDROCHLORIDE 15 MG/1
TABLET ORAL
Qty: 180 TABLET | Refills: 0 | Status: SHIPPED | OUTPATIENT
Start: 2020-04-28 | End: 2020-08-11

## 2020-04-28 RX ORDER — TOPIRAMATE 50 MG/1
50 TABLET, FILM COATED ORAL
Qty: 225 TABLET | Refills: 0 | Status: SHIPPED | OUTPATIENT
Start: 2020-04-28 | End: 2020-08-20

## 2020-04-28 NOTE — PROGRESS NOTES
Start Time:  1622       End Time: 1636    Telemedicine Visit: The patient's condition can be safely assessed and treated via synchronous audio and visual telemedicine encounter.      Reason for Telemedicine Visit: Due to COVID 19 pandemic, clinic switching all appointments to telemedicine     Originating Site (Patient Location): Patient's home    Distant Site (Provider Location): Provider Remote Setting    Consent:  The patient/guardian has verbally consented to: the potential risks and benefits of telemedicine (video visit) versus in person care; bill my insurance or make self-payment for services provided; and responsibility for payment of non-covered services.     Mode of Communication:  Video Conference via Doxy.    As the provider I attest to compliance with applicable laws and regulations related to telemedicine.    Psychiatry Clinic Progress Note                                                                  Patient Name: Gaviota Lopez  YOB: 1980  MRN: [id number]  Date of Service:  4/28/2020  Last Seen:1/7/2020    Gaviota Lopez is a 39 year old gender queer person who uses the name Linda and pronoun tawanda.       Gaviota Lopez is a 39 year old year old adult who presents for ongoing psychiatric care.  aGviota Lopez was last seen on 1/7/2020.     At that time,     Medication Ordered/Consults/Labs/tests Ordered:      Medication: continue on current medication regimen for now  OTC Recommendations: none  Lab Orders:  none  Referrals: none, already on waitlist for [facility]   Release of Information: none  Future Treatment Considerations: Per symptoms. Trintellix or Paxil per [employer] typical use  Return for Follow Up: in 2 months or if [facility] appiontment available, to see them for consult and return to this writer for ongoing management    Pt had [facility] consult on 4/9/2020     5) PATIENT RECOMMENDATIONS:  -Exercise during symptom onset. Cardio is preferred with strong evidence  for this being effective.  -Increase complex carbohydrate intake when symptoms begin. Reduce sugar and sodium intake.  -Avoid alcohol and caffeine as best able, especially during symptomatic periods.   -Trial per interest the following supplements:  - Magnesium 400-800mg/day  - Calcium 1200-1600mg/day  - Vit B6 50-100mg/day  - Chasteberry   -To track cycles: [email or web address]  -For additional information: [email or web address]     Pertinent Background:  Diagnoses include PMDD, anxiety, depression.  Psych critical item history includes mutiple psychotropic trials and eating disorder (Bullimia).      Previous Psychiatric Meds: Ativan (helpful only when menstrual cycle can be predictable, also built tolerance over time), Beatris (mood exacerbation), Zoloft (not effective), Prozac (not effective), Trazodone (oversedation at 100 mg, not effective at 50mg), Pristiq (not effective), elavil (helped sleep only), Vyvance (helpful for anxiety at 20 mg in AM, but sleep cycle worsening), Buspar (increased anxiety at 30 mg BID)    Interim History                                                                                                        4, 4     Since the last visit, pt continues to report taking Topamax 25 mg daily PRN in AM during the luteal phase. Continues to note mood changes is luteal phase.  Denies SI, SIB or HI, noting stability in eating.  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  Pt has started Vitamin B6 supplementation about a week ago and wants to make changes with supplement first slowly and if these recommendations have not improved the symptoms, then consider trial of Trintellix in the future while continuing current medication regimen.     Denies any symptoms suggestive of hypomania or psychosis.    Current Suicidality/Hx of Suicide Attempts: Denies both  CoCominent Medical concerns: irregular  menses    Medication Side Effects: The patient denies all medication side effects.      Medical Review of Systems     Apart from the symptoms mentioned int he HPI, the 14 point review of systems, including constitutional, HEENT, cardiovascular, respiratory, gastrointestinal, genitourinary, musculoskeletal, integumentary, endocrine, neurological, hematologic and allergic is entirely negative except irregular menses.    Pregnant: None. Nursing: None, Contraception: not currently sexually active      Substance Use   Pt has been staying substance free since last seen.     Medical / Surgical History                                                                                                                  Patient Active Problem List   Diagnosis     Amenorrhea       Past Surgical History:   Procedure Laterality Date     CLOSED RX CLAVICLE FRACTURE Right     Surgery        Social/ Family History                                  [per patient report]                                 1ea,1ea     Living arrangements: lives alone and feels safe  Social Support: friends, family and therapist  Access to gun: denies    Allergy                                Cephalosporins    Current Medications                                                                                                       Current Outpatient Medications   Medication Sig Dispense Refill     albuterol (PROAIR RESPICLICK) 108 (90 Base) MCG/ACT inhaler Inhale 2 puffs into the lungs every 6 hours as needed for shortness of breath / dyspnea or wheezing 1 Inhaler 0     atorvastatin (LIPITOR) 40 MG tablet Take 1 tablet (40 mg) by mouth daily (Patient not taking: Reported on 10/10/2019) 90 tablet 3     busPIRone (BUSPAR) 10 MG tablet Take 1 tab 2 times a day together with one 15 mg to make total of 25 mg 2 times a day 180 tablet 0     busPIRone (BUSPAR) 15 MG tablet Take 1 tab 2 times a day together with one 10 mg tablet to make total of 25 mg 2 times a day 180  "tablet 0     topiramate (TOPAMAX) 50 MG tablet Take 1 tablet (50 mg) by mouth 2 times daily 25 mg as needed. 225 tablet 0          Mental Status Exam                                                                                   9, 14 cog        Alertness: alert  and oriented  Appearance:  Casually dressed and Well groomed  Behavior/Demeanor: cooperative, pleasant and calm, with good  eye contact   Speech: regular rate and rhythm  Mood :  \"okay\"  Affect: slightly subdued; was congruent to mood; was congruent to content  Thought Process (Associations):  Logical, Linear and Goal directed  Thought process (Rate):  Normal  Thought content:  no overt psychosis, denies suicidal ideation, intent or thoughts and patient does not appear to be responding to internal stimuli  Perception:  Reports none;  Denies depersonalization and derealization  Attention/Concentration:  Normal  Memory:  Immediate recall intact and Short-term memory intact  Language: intact  Fund of Knowledge/Intelligence:  Above average  Abstraction:  Normal  Insight:  Good  Judgment:  Good  Cognition: (6) does  appear grossly intact; formal cognitive testing was not done    Physical Exam     Motor activity/EPS:  Normal  Gait:  Normal  Psychomotor: normal or unremarkable    Labs and Results      Pertinent findings on review include: Review of records with relevant information reported in the HPI.  Reviewed pt's past medical record and obtained collateral information.      [place] PRES[place] MONITORING PROGRAM [] was checked today:  indicates no refills last 12 months.    PHQ9 Today:  N/A  PHQ 11/26/2019   PHQ-9 Total Score 8   Q9: Thoughts of better off dead/self-harm past 2 weeks Not at all       JERONIMO 7 Today: N/A  No flowsheet data found.    Recent Labs   Lab Test 10/10/19  0820   CR 0.86   GFRESTIMATED 85     Recent Labs   Lab Test 10/10/19  0820   AST 20   ALT 20   ALKPHOS 58       PSYCHOTROPIC DRUG INTERACTIONS:    no.  MANAGEMENT:  " N/A    Impression/Assessment      Gaviota Lopez is a 39 year old adult  who presents for med management follow up.  Pt appears stable in her mood and anxiety, denies SI, SIB or HI.  Pt continues to use PRN Topamax during luteal phase.  After women's health consult, pt started Vitamin B6 supplementation and plans to add additional supplement recommendation one by one after trying 1 month at a time.  Pt was planning to start Chasteberry next, but discussed since Magasium has more study, to try adding Mg after trial of B6 for a month, then calcium and try Chasteberry at last as this has the least evidence.  Pt will just continue on current medication regimen.  OK to continue current medication regimen for now and after trials of supplement, pt may consider adding Trintellix, Effexor or Panxil.    Pt has not had a follow up with PCP about subclinical hypothyroidism and encouraged to follow up when the pandemic is better managed as this may be affecting her mood.        Diagnosis                                                                   PMDD  Unspecified eating d/o.  Hx dx of JERONIMO    Treatment Recommendation & Plan       Medication Ordered/Consults/Labs/tests Ordered:     Medication: continue current medication regimen  OTC Recommendations: follow up with Women's health consult on 4/9/2020  Lab Orders:  none  Referrals: none  Release of Information: none  Future Treatment Considerations: Per symptoms. Follow up with PCP for thyroid  Return for Follow Up: in 3-4 months after trial of supplements per pt    -Discussed safety plan for suicidal thoughts  -Discussed plan for suicidality  -Discussed available emergency services  -Patient agrees with the treatment plan  -Encouraged to continue outpatient therapy to gain more coping mechanism for stress.      Treatment Risk Statement: Discussed with the patient my impressions, as well as recommended studies. I educated patient on the differential diagnosis and  prognosis. I discussed with the patient the risks and benefits of medications versus no interventions, including efficacy, dose, possible side effects and length of treatment and the importance of medication compliance.  The patient understands the risks, benefits, adverse effects and alternatives. Agrees to treatment with the capacity to do so. No medical contraindications to treatment. The patient also understands the risks of using street drugs or alcohol.     CRISIS NUMBERS:   Provided routinely in AVS.      Pari Duffy CNP,  4/28/2020

## 2020-08-07 DIAGNOSIS — F32.81 PMDD (PREMENSTRUAL DYSPHORIC DISORDER): ICD-10-CM

## 2020-08-11 RX ORDER — BUSPIRONE HYDROCHLORIDE 15 MG/1
TABLET ORAL
Qty: 60 TABLET | Refills: 0 | Status: SHIPPED | OUTPATIENT
Start: 2020-08-11 | End: 2020-08-20

## 2020-08-11 RX ORDER — BUSPIRONE HYDROCHLORIDE 10 MG/1
TABLET ORAL
Qty: 60 TABLET | Refills: 0 | Status: SHIPPED | OUTPATIENT
Start: 2020-08-11 | End: 2020-08-20

## 2020-08-11 NOTE — TELEPHONE ENCOUNTER
busPIRone (BUSPAR) 10MG & 15 MG   Last refilled: 4/28/20  Qty: 180    Last seen: 4/28/20  RTC: 3 MOS  Cancel: 0  No-show: 0  Next appt: 8/20/20  Refill decision: Refilled for 30 days per protocol.

## 2020-08-20 ENCOUNTER — VIRTUAL VISIT (OUTPATIENT)
Dept: PSYCHIATRY | Facility: CLINIC | Age: 40
End: 2020-08-20
Attending: NURSE PRACTITIONER
Payer: COMMERCIAL

## 2020-08-20 DIAGNOSIS — F50.9 EATING DISORDER, UNSPECIFIED TYPE: ICD-10-CM

## 2020-08-20 DIAGNOSIS — F32.81 PMDD (PREMENSTRUAL DYSPHORIC DISORDER): Primary | ICD-10-CM

## 2020-08-20 RX ORDER — BUSPIRONE HYDROCHLORIDE 10 MG/1
TABLET ORAL
Qty: 180 TABLET | Refills: 1 | Status: SHIPPED | OUTPATIENT
Start: 2020-08-20 | End: 2021-02-02

## 2020-08-20 RX ORDER — BUSPIRONE HYDROCHLORIDE 15 MG/1
TABLET ORAL
Qty: 180 TABLET | Refills: 1 | Status: SHIPPED | OUTPATIENT
Start: 2020-08-20 | End: 2021-02-02

## 2020-08-20 RX ORDER — TOPIRAMATE 50 MG/1
50 TABLET, FILM COATED ORAL
Qty: 225 TABLET | Refills: 1 | Status: SHIPPED | OUTPATIENT
Start: 2020-08-20 | End: 2021-02-02

## 2020-08-20 ASSESSMENT — PAIN SCALES - GENERAL: PAINLEVEL: NO PAIN (0)

## 2020-08-20 NOTE — PROGRESS NOTES
"VIDEO VISIT  Gaviota Lopez is a 39 year old patient who is being evaluated via a billable video visit.      The patient has been notified of following:   \"This video visit will be conducted via a call between you and your physician/provider. We have found that certain health care needs can be provided without the need for an in-person physical exam. This service lets us provide the care you need with a video conversation. If a prescription is necessary we can send it directly to your pharmacy. If lab work is needed we can place an order for that and you can then stop by our lab to have the test done at a later time. Insurers are generally covering virtual visits as they would in-office visits so billing should not be different than normal.  If for some reason you do get billed incorrectly, you should contact the billing office to correct it and that number is in the AVS .    Video Conference to be completed via:  Danae    Patient has given verbal consent for video visit?:  Yes    Patient would prefer that any video invitations be sent by: Send to e-mail at: [email or web address]      How would patient like to obtain AVS?:  [employer]    AVS SmartPhrase [PsychAVS] has been placed in 'Patient Instructions':  No     Start Time:  1029         End Time: 1055    Telemedicine Visit: The patient's condition can be safely assessed and treated via synchronous audio and visual telemedicine encounter.      Reason for Telemedicine Visit: Due to COVID 19 pandemic, clinic switching all appointments to telemedicine     Originating Site (Patient Location): Patient's home    Distant Site (Provider Location): Provider Remote Setting    Consent:  The patient/guardian has verbally consented to: the potential risks and benefits of telemedicine (video visit) versus in person care; bill my insurance or make self-payment for services provided; and responsibility for payment of non-covered services.     Mode of Communication:  " Video Conference via [employer]    As the provider I attest to compliance with applicable laws and regulations related to telemedicine.    Psychiatry Clinic Progress Note                                                                  Patient Name: Gaviota Lopez  YOB: 1980  MRN: [id number]  Date of Service:  8/20/2020  Last Seen:4/28/2020    Gaviota Lopez is a 39 year old female who uses the name Gaviota and pronoun she.        Gaviota Lopez is a 39 year old year old adult who presents for ongoing psychiatric care.  Gaviota Lopez was last seen on 4/28/2020.     At that time,     Medication Ordered/Consults/Labs/tests Ordered:      Medication: continue current medication regimen  OTC Recommendations: follow up with Women's health consult on 4/9/2020  Lab Orders:  none  Referrals: none  Release of Information: none  Future Treatment Considerations: Per symptoms. Follow up with PCP for thyroid  Return for Follow Up: in 3-4 months after trial of supplements per pt      Pertinent Background:  Diagnoses include PMDD, anxiety, depression.  Psych critical item history includes mutiple psychotropic trials and eating disorder (Bullimia).      Previous Psychiatric Meds: Ativan (helpful only when menstrual cycle can be predictable, also built tolerance over time), Beatris (mood exacerbation), Zoloft (not effective), Prozac (not effective), Trazodone (oversedation at 100 mg, not effective at 50mg), Pristiq (not effective), elavil (helped sleep only), Vyvance (helpful for anxiety at 20 mg in AM, but sleep cycle worsening), Buspar (increased anxiety at 30 mg BID)    Interim History                                                                                                        4, 4     Since the last visit, pt noted she started Mg and B6 while also was on Ca supplement. Initially, this seemed to be helpful, but last 1-2 months, feels effects have gone away. 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  Pt notes last couple months, noting more burn out, but mood has been ok, still has agitation during luteal phase, but does not take PRN Topamax as often as she should partly due to significant sedation that when she takes PRN dose, she can't seem to work well, but notes probably sedation is worth considering significant agitation.  Pt wants to continue on current medication regimen while she would add Chasteberry supplement that was recommended by [facility] consult.    Denies any symptoms suggestive of hypomania or psychosis.    Current Suicidality/Hx of Suicide Attempts: Denies both  CoCominent Medical concerns: irregular menses    Medication Side Effects: The patient denies all medication side effects.      Medical Review of Systems     Apart from the symptoms mentioned int he HPI, the 14 point review of systems, including constitutional, HEENT, cardiovascular, respiratory, gastrointestinal, genitourinary, musculoskeletal, integumentary, endocrine, neurological, hematologic and allergic is entirely negative.    Pregnant: None. Nursing: None, Contraception: not currently sexually active      Substance Use   Pt has been staying substance free since last seen.      Medical / Surgical History                                                                                                                  Patient Active Problem List   Diagnosis     Amenorrhea       Past Surgical History:   Procedure Laterality Date     CLOSED RX CLAVICLE FRACTURE Right     Surgery        Social/ Family History                                  [per patient report]                                 1ea,1ea   Living arrangements: lives alone and feels safe  Social Support: friends, family and therapist  Access to  "gun: denies    Allergy                                Cephalosporins    Current Medications                                                                                                       Current Outpatient Medications   Medication Sig Dispense Refill     albuterol (PROAIR RESPICLICK) 108 (90 Base) MCG/ACT inhaler Inhale 2 puffs into the lungs every 6 hours as needed for shortness of breath / dyspnea or wheezing 1 Inhaler 0     atorvastatin (LIPITOR) 40 MG tablet Take 1 tablet (40 mg) by mouth daily 90 tablet 3     busPIRone (BUSPAR) 10 MG tablet Take 1 tab 2 times a day together with one 15 mg to make total of 25 mg 2 times a day 60 tablet 0     busPIRone (BUSPAR) 15 MG tablet Take 1 tab 2 times a day together with one 10 mg tablet to make total of 25 mg 2 times a day 60 tablet 0     topiramate (TOPAMAX) 50 MG tablet Take 1 tablet (50 mg) by mouth 2 times daily 25 mg as needed. 225 tablet 0        Mental Status Exam                                                                                   9, 14 cog        Alertness: alert  and oriented  Appearance:  Casually dressed and Well groomed  Behavior/Demeanor: cooperative, pleasant and calm, with good  eye contact   Speech: regular rate and rhythm  Mood :  \"okay\"  Affect: full range and appropriate; was congruent to mood; was congruent to content  Thought Process (Associations):  Logical, Linear and Goal directed  Thought process (Rate):  Normal  Thought content:  no overt psychosis, denies suicidal ideation, intent or thoughts and patient does not appear to be responding to internal stimuli  Perception:  Reports none;  Denies auditory hallucinations, visual hallucinations, depersonalization and derealization  Attention/Concentration:  Normal  Memory:  Immediate recall intact and Short-term memory intact  Language: intact  Fund of Knowledge/Intelligence:  Above average  Abstraction:  Normal  Insight:  Good  Judgment:  Good  Cognition: (6) does  appear grossly " intact; formal cognitive testing was not done    Physical Exam     Motor activity/EPS:  Normal  Gait:  Normal  Psychomotor: normal or unremarkable    Labs and Results      Pertinent findings on review include: Review of records with relevant information reported in the HPI.  Reviewed pt's past medical record and obtained collateral information.      [place] MONIT[place] [] was checked today:  not using controlled substances.    PHQ9 Today:  N/A  PHQ 11/26/2019   PHQ-9 Total Score 8   Q9: Thoughts of better off dead/self-harm past 2 weeks Not at all       JERONIMO 7 Today: N/A  No flowsheet data found.    Recent Labs   Lab Test 10/10/19  0820   CR 0.86   GFRESTIMATED 85     Recent Labs   Lab Test 10/10/19  0820   AST 20   ALT 20   ALKPHOS 58       PSYCHOTROPIC DRUG INTERACTIONS:    no.  MANAGEMENT:  N/A    Impression/Assessment      Gaviota Lopez is a 39 year old adult  who presents for med management follow up.  Pt appears stable in her mood and anxiety, denies SI, SIB or HI, however this is not her luteal phase where she usually has significant agitation.  She noted that she has not been taking PRN Topamax on the time she needed due to oversedation, but she feels it's worth sedation to help with agitation as she finds helpful when she takes PRN Topamax.  Encouraged to take PRN Topamax consistently during luteal phase.  Pt wants to continue with current medication regimen as she feels stable enough and wants consistent PRN Topamax use and also start Chasteberry supplement per women's health consult in 4/2020.  Also discussed if she is tolerating Mg well, pt may increase the dose.  Will continue on current medication regimen.    Also reiterated previous subclinical TSH elevation, but if this continues, it may be beneficial to be treated as subclinical hypothyroid may cause significant depression.  Pt will check this with her PCP during her annual.        Diagnosis                                                                     PMDD  Unspecified eating d/o.  Hx dx of JERONIMO    Treatment Recommendation & Plan       Medication Ordered/Consults/Labs/tests Ordered:     Medication: Continue current medication regimen   OTC Recommendations: May take higher dose of Mg and try Chasteberry  Lab Orders:  None, TSH with PCP  Referrals: none  Release of Information: none  Future Treatment Considerations: Per symptoms. Trintellix, effexor or Paxil trial per [employer]?  Return for Follow Up: 3-6 months    -Discussed safety plan for suicidal thoughts  -Discussed plan for suicidality  -Discussed available emergency services  -Patient agrees with the treatment plan  -Encouraged to continue outpatient therapy to gain more coping mechanism for stress.      Treatment Risk Statement: Discussed with the patient my impressions, as well as recommended studies. I educated patient on the differential diagnosis and prognosis. I discussed with the patient the risks and benefits of medications versus no interventions, including efficacy, dose, possible side effects and length of treatment and the importance of medication compliance.  The patient understands the risks, benefits, adverse effects and alternatives. Agrees to treatment with the capacity to do so. No medical contraindications to treatment. The patient also understands the risks of using street drugs or alcohol.  CRISIS NUMBERS:   Provided routinely in AVS.      Pari Duffy CNP,  8/20/2020

## 2020-08-20 NOTE — PATIENT INSTRUCTIONS
-Continue current medication regimen   -Follow up with your primary care about thyroid in annual visit.  If continues to have subclinical hypothyroid, it may be worth to be treated for your mood stability.  -Follow up in 3-6 months    Thank you for coming to the [facility].    Lab Testing:  If you had lab testing today and your results are reassuring or normal they will be mailed to you or sent through prollie within 7 days. If the lab tests need quick action we will call you with the results. The phone number we will call with results is # [phone] (home) . If this is not the best number please call our clinic and change the number.    Medication Refills:  If you need any refills please call your pharmacy and they will contact us. Our fax number for refills is [phone]. Please allow three business for refill processing. If you need to  your refill at a new pharmacy, please contact the new pharmacy directly. The new pharmacy will help you get your medications transferred.     Scheduling:  If you have any concerns about today's visit or wish to schedule another appointment please call our office during normal business hours [phone] (8-5:00 M-F)    Contact Us:  Please call [phone] during business hours (8-5:00 M-F).  If after clinic hours, or on the weekend, please call  [phone].    Financial Assistance [phone]  [facility] Billing [phone]  Central Billing Office, [facility]: [phone]  [place] Billing [phone]  Medical Records [phone]      MENTAL HEALTH CRISIS NUMBERS:  For a medical emergency please call  911 or go to the nearest ER.     [facility]:   [facility] -[phone]   Crisis Residence [facility] -[phone]   Walk-In Counseling Center [facility] -[phone]   COPE 24/7 [place] Mobile Team -[phone] (adults)/[phone] (child)  CHILD: [facility] needs assessment team - [phone]      [facility]:   [facility]  [facility] - [phone]   Walk-in counseling [facility] - [phone]   Walk-in counseling [facility] - [phone]   Crisis Residence [facility] - [phone]  Urgent Care Adult Mental [place]-[phone] mobile unit/ 24/7 crisis line    National Crisis Numbers:   National Suicide Prevention Lifeline: 9-021-479-TALK (162-905-1877)  Poison Control Center - 1-391-597-3747  [employer]/resources for a list of additional resources (SOS)  Trans Lifeline a hotline for transgender people 1-651.172.3884  The [employer] Project a hotline for LGBT youth 1-232.563.8756  Crisis Text Line: For any crisis 24/7   To: 771946  see www.crisistextline.org  - IF MAKING A CALL FEELS TOO HARD, send a text!         Again thank you for choosing [facility] and please let us know how we can best partner with you to improve you and your family's health.    You may be receiving a survey regarding this appointment. We would love to have your feedback, both positive and negative. The survey is done by an external company, so your answers are anonymous.

## 2020-09-23 ENCOUNTER — TRANSFERRED RECORDS (OUTPATIENT)
Dept: HEALTH INFORMATION MANAGEMENT | Facility: CLINIC | Age: 40
End: 2020-09-23

## 2020-10-09 DIAGNOSIS — E03.8 SUBCLINICAL HYPOTHYROIDISM: ICD-10-CM

## 2020-10-09 LAB
T4 FREE SERPL-MCNC: 0.96 NG/DL (ref 0.76–1.46)
TSH SERPL DL<=0.005 MIU/L-ACNC: 2.48 MU/L (ref 0.4–4)

## 2020-10-09 PROCEDURE — 84439 ASSAY OF FREE THYROXINE: CPT | Performed by: PATHOLOGY

## 2020-10-09 PROCEDURE — 84443 ASSAY THYROID STIM HORMONE: CPT | Performed by: PATHOLOGY

## 2020-10-22 ENCOUNTER — OFFICE VISIT (OUTPATIENT)
Dept: FAMILY MEDICINE | Facility: CLINIC | Age: 40
End: 2020-10-22
Payer: COMMERCIAL

## 2020-10-22 VITALS
WEIGHT: 114.1 LBS | TEMPERATURE: 98 F | BODY MASS INDEX: 19.48 KG/M2 | OXYGEN SATURATION: 100 % | HEART RATE: 59 BPM | RESPIRATION RATE: 16 BRPM | DIASTOLIC BLOOD PRESSURE: 73 MMHG | SYSTOLIC BLOOD PRESSURE: 119 MMHG | HEIGHT: 64 IN

## 2020-10-22 DIAGNOSIS — E03.8 SUBCLINICAL HYPOTHYROIDISM: ICD-10-CM

## 2020-10-22 DIAGNOSIS — Z11.4 SCREENING FOR HIV (HUMAN IMMUNODEFICIENCY VIRUS): ICD-10-CM

## 2020-10-22 DIAGNOSIS — Z13.1 SCREENING FOR DIABETES MELLITUS: ICD-10-CM

## 2020-10-22 DIAGNOSIS — J45.20 MILD INTERMITTENT ASTHMA WITHOUT COMPLICATION: ICD-10-CM

## 2020-10-22 DIAGNOSIS — N94.3 PMS (PREMENSTRUAL SYNDROME): ICD-10-CM

## 2020-10-22 DIAGNOSIS — Z13.220 SCREENING FOR HYPERLIPIDEMIA: ICD-10-CM

## 2020-10-22 DIAGNOSIS — Z00.00 ROUTINE GENERAL MEDICAL EXAMINATION AT A HEALTH CARE FACILITY: Primary | ICD-10-CM

## 2020-10-22 PROCEDURE — 99396 PREV VISIT EST AGE 40-64: CPT | Performed by: NURSE PRACTITIONER

## 2020-10-22 ASSESSMENT — ASTHMA QUESTIONNAIRES
QUESTION_3 LAST FOUR WEEKS HOW OFTEN DID YOUR ASTHMA SYMPTOMS (WHEEZING, COUGHING, SHORTNESS OF BREATH, CHEST TIGHTNESS OR PAIN) WAKE YOU UP AT NIGHT OR EARLIER THAN USUAL IN THE MORNING: NOT AT ALL
QUESTION_4 LAST FOUR WEEKS HOW OFTEN HAVE YOU USED YOUR RESCUE INHALER OR NEBULIZER MEDICATION (SUCH AS ALBUTEROL): NOT AT ALL
ACT_TOTALSCORE: 25
QUESTION_2 LAST FOUR WEEKS HOW OFTEN HAVE YOU HAD SHORTNESS OF BREATH: NOT AT ALL
QUESTION_1 LAST FOUR WEEKS HOW MUCH OF THE TIME DID YOUR ASTHMA KEEP YOU FROM GETTING AS MUCH DONE AT WORK, SCHOOL OR AT HOME: NONE OF THE TIME
QUESTION_5 LAST FOUR WEEKS HOW WOULD YOU RATE YOUR ASTHMA CONTROL: COMPLETELY CONTROLLED

## 2020-10-22 ASSESSMENT — PAIN SCALES - GENERAL: PAINLEVEL: NO PAIN (0)

## 2020-10-22 ASSESSMENT — MIFFLIN-ST. JEOR: SCORE: 1172.55

## 2020-10-22 NOTE — NURSING NOTE
"40 year old  Chief Complaint   Patient presents with     Physical     Patient comes in for a physical exam.       Blood pressure 119/73, pulse 59, temperature 98  F (36.7  C), temperature source Oral, resp. rate 16, height 1.626 m (5' 4\"), weight 51.8 kg (114 lb 1.6 oz), SpO2 100 %. Body mass index is 19.59 kg/m .  BP completed using cuff size:    Patient Active Problem List   Diagnosis     Amenorrhea       Wt Readings from Last 2 Encounters:   10/22/20 51.8 kg (114 lb 1.6 oz)   10/10/19 52 kg (114 lb 9.6 oz)     BP Readings from Last 3 Encounters:   10/22/20 119/73   10/10/19 101/59   03/08/17 104/67       Allergies   Allergen Reactions     Cephalosporins Hives       Current Outpatient Medications   Medication     albuterol (PROAIR RESPICLICK) 108 (90 Base) MCG/ACT inhaler     busPIRone (BUSPAR) 10 MG tablet     busPIRone (BUSPAR) 15 MG tablet     topiramate (TOPAMAX) 50 MG tablet     No current facility-administered medications for this visit.        Social History     Tobacco Use     Smoking status: Never Smoker     Smokeless tobacco: Never Used   Substance Use Topics     Alcohol use: No     Drug use: No       Honoring Choices - Health Care Directive Guide offered to patient at time of visit.    Health Maintenance Due   Topic Date Due     ASTHMA ACTION PLAN  1980     ASTHMA CONTROL TEST  1980     Pneumococcal Vaccine: Pediatrics (0 to 5 Years) and At-Risk Patients (6 to 64 Years) (1 of 1 - PPSV23) 08/31/1986     HIV SCREENING  08/31/1995     PREVENTIVE CARE VISIT  10/10/2020       Immunization History   Administered Date(s) Administered     DTaP, Unspecified 06/04/2015     Influenza (IIV3) PF 11/29/2001, 12/22/2004     Influenza Vaccine IM > 6 months Valent IIV4 09/25/2020       Lab Results   Component Value Date    PAP NIL 10/10/2019         Recent Labs   Lab Test 10/09/20  1701 11/26/19  0846 10/10/19  0820   LDL  --   --  123*   HDL  --   --  68   TRIG  --   --  49   ALT  --   --  20   CR  --   --  " 0.86   GFRESTIMATED  --   --  85   GFRESTBLACK  --   --  >90   ALBUMIN  --   --  4.2   POTASSIUM  --   --  3.6   TSH 2.48 5.40* 4.19*       PHQ-2 ( 1999 Pfizer) 10/22/2020 10/10/2019   Q1: Little interest or pleasure in doing things 1 0   Q2: Feeling down, depressed or hopeless 1 0   PHQ-2 Score 2 0   Some encounter information is confidential and restricted. Go to Review Flowsheets activity to see all data.       No flowsheet data found.    No flowsheet data found.    ACT Total Scores 10/22/2020   ACT TOTAL SCORE (Goal Greater than or Equal to 20) 25   In the past 12 months, how many times did you visit the emergency room for your asthma without being admitted to the hospital? 0   In the past 12 months, how many times were you hospitalized overnight because of your asthma? 0       Brooks Cruz, EMT  October 22, 2020 2:52 PM

## 2020-10-22 NOTE — PROGRESS NOTES
SUBJECTIVE:       CC: Gaviota Lopez is a 40 year old woman who presents for preventive health visit.     HPI: Patient is seen today for preventive health visit. She expressed concerns for dysmenorrhea and inquires of options for relief. She usually has her monthly cycles every 4 to 6 weeks, 5-6 days in length and described it as regular amount of menstrual flow. Dysmenorrhea however is difficult for the patient, describes it as moderate to severe discomfort, it interferes with her daily activities, and affects her inability to sleep at night. She has utilized Tylenol, Aleve, and hot pack, but it has only help minimally.    Healthy Habits:    Do you get at least three servings of calcium containing foods daily (dairy, green leafy vegetables, etc.)?  Yes, the patient also reports to using daily Vitamin D and Calcium supplements     Amount of exercise or daily activities, outside of work: 30  Min/day, 4-6 days per week running, biking, swimming, and walking. Strength training 2x/week     Problems taking medications regularly No    Medication side effects: No    Have you had an eye exam in the past two years? yes    Do you see a dentist twice per year? yes    Do you have sleep apnea, excessive snoring or daytime drowsiness? No       The patient denies to utilizing tobacco, illicit drug use, alcohol, and caffeine intake. She typically gets 7 hours of sleep per night, with occasional difficulty with sleeping but not as different compared to her usual sleep routine. The patient is currently employed as a full-time genetic counselor.       Today's PHQ-2 Score:   PHQ-2 ( 1999 Pfizer) 10/22/2020 10/10/2019   Q1: Little interest or pleasure in doing things 1 0   Q2: Feeling down, depressed or hopeless 1 0   PHQ-2 Score 2 0   Some encounter information is confidential and restricted. Go to Review Flowsheets activity to see all data.       Abuse: Current or Past(Physical, Sexual or Emotional)- NO  Do you feel safe in  your environment? YES      Reviewed orders with patient.  Reviewed health maintenance and updated orders accordingly - Yes  Lab work is in process  Labs reviewed in EPIC  BP Readings from Last 3 Encounters:   10/22/20 119/73   10/10/19 101/59   03/08/17 104/67    Wt Readings from Last 3 Encounters:   10/22/20 51.8 kg (114 lb 1.6 oz)   10/10/19 52 kg (114 lb 9.6 oz)   03/08/17 55.3 kg (122 lb)                  Patient Active Problem List   Diagnosis     Amenorrhea     Past Surgical History:   Procedure Laterality Date     CLOSED RX CLAVICLE FRACTURE Right     Surgery       Social History     Tobacco Use     Smoking status: Never Smoker     Smokeless tobacco: Never Used   Substance Use Topics     Alcohol use: No     Family History   Problem Relation Age of Onset     C.A.D. Paternal Grandfather      Cancer - colorectal Paternal Grandfather      Diabetes Maternal Grandfather      Hypertension Maternal Grandfather      Diabetes Maternal Uncle      Hypertension Maternal Uncle      No Known Problems Mother      No Known Problems Father      No Known Problems Brother          Current Outpatient Medications   Medication Sig Dispense Refill     albuterol (PROAIR RESPICLICK) 108 (90 Base) MCG/ACT inhaler Inhale 2 puffs into the lungs every 6 hours as needed for shortness of breath / dyspnea or wheezing 1 Inhaler 0     busPIRone (BUSPAR) 10 MG tablet Take 1 tab 2 times a day together with one 15 mg to make total of 25 mg 2 times a day 180 tablet 1     busPIRone (BUSPAR) 15 MG tablet Take 1 tab 2 times a day together with one 10 mg tab to to make total of 25 mg 2 times a day 180 tablet 1     topiramate (TOPAMAX) 50 MG tablet Take 1 tablet (50 mg) by mouth 2 times daily And 25 mg daily as needed. 225 tablet 1     Allergies   Allergen Reactions     Cephalosporins Hives     Recent Labs   Lab Test 10/09/20  1701 11/26/19  0846 10/10/19  0820   LDL  --   --  123*   HDL  --   --  68   TRIG  --   --  49   ALT  --   --  20   CR  --   --   0.86   GFRESTIMATED  --   --  85   GFRESTBLACK  --   --  >90   POTASSIUM  --   --  3.6   TSH 2.48 5.40* 4.19*        Mammogram Screening: Patient under age 50, mutual decision reflected in health maintenance.      Pertinent mammograms are reviewed under the imaging tab.  History of abnormal Pap smear: NO - age 30-65 PAP every 5 years with negative HPV co-testing recommended  PAP / HPV Latest Ref Rng & Units 10/10/2019   PAP - NIL   HPV 16 DNA NEG:Negative Negative   HPV 18 DNA NEG:Negative Negative   OTHER HR HPV NEG:Negative Negative     Reviewed and updated as needed this visit by clinical staff  Tobacco  Allergies  Meds             Allergies:    Cephalosporins- Hives     Reviewed and updated as needed this visit by Provider                Past Medical History:   Diagnosis Date     Allergic rhinitis, cause unspecified      Anxiety      Eating disorder     Bulimia     PMDD (premenstrual dysphoric disorder)      Raynaud disease      Subclinical hypothyroidism      Unspecified asthma(493.90)       Past Surgical History:   Procedure Laterality Date     CLOSED RX CLAVICLE FRACTURE Right     Surgery     OB History   No obstetric history on file.     Family History:   MGF- Diabetes and Hypertension  PGF- CAD and colorectal cancer   Maternal uncle (1)- Diabetes  Maternal uncle (2)- Hypertension     ROS:    General: Negative for weight changes, fatigue, weakness, changes in appetite and sleep patterns, fever, chills, and night sweats   Skin/Hair/Nails: Negative for rashes, itching, and changes in hair or nails   HEENT: Negative for headache, earache, eye pain, sinus pain, dysphagia, voice changes, and pain in mouth. Patient utilizes prescription glasses. Positive for seasonal allergic rhinitis   Breast: Denies lumps, breast pain and discharge   Respiratory: Negative for shortness of breath, hemoptysis, and wheezing. Positive for history of asthma but reported to only using her rescue inhaler twice this  "year.  Cardiovascular: Negative for chest pain, bilateral upper and lower edema, and palpitations  Gastrointestinal: Negative for dysphagia, nausea & vomiting, and change in bowel pattern. LBM was 10/22/20.   Genitourinary: Negative for dysuria, urinary frequency, and urgency.    Female Reproductive: LMP 10/8/2020, reports to regular menstrual cycle every 4-6 weeks,  length of 5 days of menstruation in average amount. Patient does report moderate to severe dysmenorrhea at which she takes Tylenol and Aleve to alleviate discomfort, but only expressed minimal relief from the OTC medication.   Musculoskeletal: Negative for joint and muscle pain, swelling, stiffness, and weakness.   Neuro and Psych: Negative for headache, and dizziness. Positive for anxiety.       Social History     Tobacco Use     Smoking status: Never Smoker     Smokeless tobacco: Never Used   Substance Use Topics     Alcohol use: No     If you drink alcohol do you typically have >3 drinks per day or >7 drinks per week?NO                          OBJECTIVE:   /73 (BP Location: Right arm, Patient Position: Sitting, Cuff Size: Adult Regular)   Pulse 59   Temp 98  F (36.7  C) (Oral)   Resp 16   Ht 1.626 m (5' 4\")   Wt 51.8 kg (114 lb 1.6 oz)   SpO2 100%   BMI 19.59 kg/m    EXAM:     GENERAL: healthy, alert and no distress  EYES: Eyes grossly normal to inspection, PERRL and conjunctivae and sclerae normal  HENT: ear canals and TM's normal, nose and mouth without ulcers or lesions  NECK: no adenopathy, no asymmetry, masses, or scars and thyroid normal to palpation  RESP: lungs clear to auscultation - no rales, rhonchi or wheezes  BREAST: normal without masses, tenderness or nipple discharge and no palpable axillary masses or adenopathy  CV: regular rate and rhythm, normal S1 S2, no S3 or S4, no murmur, click or rub, no peripheral edema and peripheral pulses strong  ABDOMEN: soft, nontender, no hepatosplenomegaly, no masses and bowel sounds " "normal  : deferred - does not a need a pap smear and no vaginal symptoms  MS: no gross musculoskeletal defects noted, no edema  SKIN: no suspicious lesions or rashes  NEURO: Normal strength and tone, mentation intact and speech normal  PSYCH: mentation appears normal, affect normal/bright, judgement and insight intact and appearance well groomed  LYMPH: no cervical, supraclavicular, axillary, or inguinal adenopathy      ASSESSMENT/PLAN:   Gaviota was seen today for physical.    Diagnoses and all orders for this visit:    Routine general medical examination at a health care facility  -     Lipid panel reflex to direct LDL Fasting; Future  -     Glucose; Future  -     Hemoglobin; Future    Mild intermittent asthma without complication  -     albuterol (PROAIR RESPICLICK) 108 (90 Base) MCG/ACT inhaler; Inhale 2 puffs into the lungs every 6 hours as needed for shortness of breath / dyspnea or wheezing    Screening for HIV (human immunodeficiency virus)  -     HIV Antigen Antibody Combo; Future    PMS (premenstrual syndrome)  -     OB/GYN REFERRAL    Subclinical hypothyroidism  -     Thyroid peroxidase antibody; Future  -     TSH; Future  -     T4 free; Future  -     Thyroid peroxidase antibody; Future    Screening for hyperlipidemia  -     Lipid panel reflex to direct LDL Fasting; Future    Screening for diabetes mellitus  -     Glucose; Future          COUNSELING:   Reviewed preventive health counseling, as reflected in patient instructions       Regular exercise       Healthy diet/nutrition       HIV screeninx in teen years, 1x in adult years, and at intervals if high risk    Estimated body mass index is 19.59 kg/m  as calculated from the following:    Height as of this encounter: 1.626 m (5' 4\").    Weight as of this encounter: 51.8 kg (114 lb 1.6 oz).          Counseling Resources:  ATP IV Guidelines  Pooled Cohorts Equation Calculator  Breast Cancer Risk Calculator  BRCA-Related Cancer Risk Assessment: " FHS-7 Tool  FRAX Risk Assessment  ICSI Preventive Guidelines  Dietary Guidelines for Americans, 2010  USDA's MyPlate  ASA Prophylaxis  Lung CA Screening    I was present with the NPP student, Jean Carlos Valdez RN, FNP-DNP student from the [facility], who participated in the service and in the documentation of the services provided. I have verified the history and personally performed the physical exam and medical decision making, as documented by the student and edited by me    BRET Sharma CNP  11/10/20  12:57 PM      Alivia Puckett (Wood), BRET, CNP  [facility] [place]

## 2020-10-22 NOTE — PATIENT INSTRUCTIONS
Preventive Health Recommendations  Female Ages 40 to 49    Yearly exam:     See your health care provider every year in order to  1. Review health changes.   2. Discuss preventive care.    3. Review your medicines if your doctor prescribed any.      Get a Pap test every three years (unless you have an abnormal result and your provider advises testing more often).      If you get Pap tests with HPV test, you only need to test every 5 years, unless you have an abnormal result. You do not need a Pap test if your uterus was removed (hysterectomy) and you have not had cancer.      You should be tested each year for STDs (sexually transmitted diseases), if you're at risk.     Ask your doctor if you should have a mammogram.      Have a colonoscopy (test for colon cancer) if someone in your family has had colon cancer or polyps before age 50.       Have a cholesterol test every 5 years.       Have a diabetes test (fasting glucose) after age 45. If you are at risk for diabetes, you should have this test every 3 years.    Shots: Get a flu shot each year. Get a tetanus shot every 10 years.     Nutrition:     Eat at least 5 servings of fruits and vegetables each day.    Eat whole-grain bread, whole-wheat pasta and brown rice instead of white grains and rice.    Get adequate Calcium and Vitamin D.      Lifestyle    Exercise at least 150 minutes a week (an average of 30 minutes a day, 5 days a week). This will help you control your weight and prevent disease.    Limit alcohol to one drink per day.    No smoking.     Wear sunscreen to prevent skin cancer.    See your dentist every six months for an exam and cleaning.    [facility] Medication Refill Request Information:  * Please contact your pharmacy regarding ANY request for medication refills.  ** NP Clinic Prescription Fax = [phone]  * Please allow 3 business days for routine medication refills.  * Please allow 5 business days for controlled substance  medication refills.     [facility] Test Result notification information:  *You will be notified with in 7-10 days of your appointment day regarding the results of your test.  If you are on MyChart you will be notified as soon as the provider has reviewed the results and signed off on them.    [facility]: [phone]

## 2020-10-23 ASSESSMENT — ASTHMA QUESTIONNAIRES: ACT_TOTALSCORE: 25

## 2021-02-02 ENCOUNTER — VIRTUAL VISIT (OUTPATIENT)
Dept: PSYCHIATRY | Facility: CLINIC | Age: 41
End: 2021-02-02
Attending: NURSE PRACTITIONER
Payer: COMMERCIAL

## 2021-02-02 DIAGNOSIS — F50.9 EATING DISORDER, UNSPECIFIED TYPE: ICD-10-CM

## 2021-02-02 DIAGNOSIS — F32.81 PMDD (PREMENSTRUAL DYSPHORIC DISORDER): Primary | ICD-10-CM

## 2021-02-02 PROCEDURE — 99214 OFFICE O/P EST MOD 30 MIN: CPT | Mod: 95 | Performed by: NURSE PRACTITIONER

## 2021-02-02 RX ORDER — BUSPIRONE HYDROCHLORIDE 10 MG/1
TABLET ORAL
Qty: 180 TABLET | Refills: 2 | Status: SHIPPED | OUTPATIENT
Start: 2021-02-02 | End: 2021-12-08

## 2021-02-02 RX ORDER — BUSPIRONE HYDROCHLORIDE 15 MG/1
TABLET ORAL
Qty: 180 TABLET | Refills: 2 | Status: SHIPPED | OUTPATIENT
Start: 2021-02-02 | End: 2021-12-08

## 2021-02-02 RX ORDER — TOPIRAMATE 50 MG/1
TABLET, FILM COATED ORAL
Qty: 225 TABLET | Refills: 2 | Status: SHIPPED | OUTPATIENT
Start: 2021-02-02 | End: 2022-02-01

## 2021-02-02 ASSESSMENT — PATIENT HEALTH QUESTIONNAIRE - PHQ9
SUM OF ALL RESPONSES TO PHQ QUESTIONS 1-9: 8
SUM OF ALL RESPONSES TO PHQ QUESTIONS 1-9: 8
10. IF YOU CHECKED OFF ANY PROBLEMS, HOW DIFFICULT HAVE THESE PROBLEMS MADE IT FOR YOU TO DO YOUR WORK, TAKE CARE OF THINGS AT HOME, OR GET ALONG WITH OTHER PEOPLE: SOMEWHAT DIFFICULT

## 2021-02-02 ASSESSMENT — PAIN SCALES - GENERAL: PAINLEVEL: NO PAIN (0)

## 2021-02-02 NOTE — PROGRESS NOTES
"VIDEO VISIT  Gaviota Lopez is a 40 year old patient who is being evaluated via a billable video visit.      The patient has been notified of following:   \"This video visit will be conducted via a call between you and your physician/provider. We have found that certain health care needs can be provided without the need for an in-person physical exam. This service lets us provide the care you need with a video conversation. If a prescription is necessary we can send it directly to your pharmacy. If lab work is needed we can place an order for that and you can then stop by our lab to have the test done at a later time. Insurers are generally covering virtual visits as they would in-office visits so billing should not be different than normal.  If for some reason you do get billed incorrectly, you should contact the billing office to correct it and that number is in the AVS .    Video Conference to be completed via:  Danae    Patient has given verbal consent for video visit?:  Yes    Patient would prefer that any video invitations be sent by: Send to e-mail at: [email or web address]      How would patient like to obtain AVS?:  ConforMIS    AVS SmartPhrase [PsychAVS] has been placed in 'Patient Instructions':  Yes     Start Time:  1630         End Time: 1654    Telemedicine Visit: The patient's condition can be safely assessed and treated via synchronous audio and visual telemedicine encounter.      Reason for Telemedicine Visit: Due to COVID 19 pandemic, clinic switching all appointments to telemedicine     Originating Site (Patient Location): Patient's home    Distant Site (Provider Location): Provider Remote Setting    Consent:  The patient/guardian has verbally consented to: the potential risks and benefits of telemedicine (video visit) versus in person care; bill my insurance or make self-payment for services provided; and responsibility for payment of non-covered services.     Mode of Communication:  " "Video Conference via AmWell    As the provider I attest to compliance with applicable laws and regulations related to telemedicine.    Psychiatry Clinic Progress Note                                                                  Patient Name: Gaviota Lopez  YOB: 1980  MRN: [id number]  Date of Service:  2/2/2021  Last Seen:8/20/2020    Gaviota Lopez is a 40 year old person assigned female at birth, identifies as cisgender female who uses the name Gaviota and pronoun she.      Gaviota Lopez is a 40 year old year old adult who presents for ongoing psychiatric care.  Gaviota Lopez was last seen on 8/20/2020.     At that time,     Medication Ordered/Consults/Labs/tests Ordered:      Medication: Continue current medication regimen   OTC Recommendations: May take higher dose of Mg and try Chasteberry  Lab Orders:  None, TSH with PCP  Referrals: none  Release of Information: none  Future Treatment Considerations: Per symptoms. Trintellix, effexor or Paxil trial per [employer]?  Return for Follow Up: 3-6 months    Pertinent Background:  Diagnoses include PMDD, anxiety, depression.  Psych critical item history includes mutiple psychotropic trials and eating disorder (Bullimia).      Previous Psychiatric Meds: Ativan (helpful only when menstrual cycle can be predictable, also built tolerance over time), Beatris (mood exacerbation), Zoloft (not effective), Prozac (not effective), Trazodone (oversedation at 100 mg, not effective at 50mg), Pristiq (not effective), elavil (helped sleep only), Vyvance (helpful for anxiety at 20 mg in AM, but sleep cycle worsening), Buspar (increased anxiety at 30 mg BID)    Interim History                                                                                                        4, 4     Since the last visit,  -Doing well.  \"pleasantly surprised\" by this.  Mood and anxiety continues to have some fluctuation in luteal phase, but manageable.  " Feels current medication regimen are working well and wants to continue.  -Takes PRN Topamax few times/month and this is helpful but due to oversedation, does not take it often.  -Tried supplements periodically, Mg and B6 may be helpful, but decided not to continue on Chasteberry due to possible increase in breast cancer incident increase.  -Working from home FT, continues to be busy as pts has delayed care and severity of the illness may have progressed further than without COVID.    Denies any symptoms suggestive of hypomania or psychosis.    Current Suicidality/Hx of Suicide Attempts: Denies both  CoCominent Medical concerns: irregular menses    Medication Side Effects: The patient denies all medication side effects.      Medical Review of Systems     Apart from the symptoms mentioned int he HPI, the 14 point review of systems, including constitutional, HEENT, cardiovascular, respiratory, gastrointestinal, genitourinary, musculoskeletal, integumentary, endocrine, neurological, hematologic and allergic is entirely negative except irregular menses.    Pregnant: None. Nursing: None, Contraception: not currently sexually active      Substance Use   Pt has been staying substance free since last seen.      Social/ Family History                                  [per patient report]                                 1ea,1ea   Living arrangements: lives alone and feels safe  Social Support: friends, family and therapist  Access to gun: denies    Allergy                                Cephalosporins    Current Medications                                                                                                       Current Outpatient Medications   Medication Sig Dispense Refill     albuterol (PROAIR RESPICLICK) 108 (90 Base) MCG/ACT inhaler Inhale 2 puffs into the lungs every 6 hours as needed for shortness of breath / dyspnea or wheezing 1 Inhaler 0     busPIRone (BUSPAR) 10 MG tablet Take 1 tab 2 times a day  "together with one 15 mg to make total of 25 mg 2 times a day 180 tablet 1     busPIRone (BUSPAR) 15 MG tablet Take 1 tab 2 times a day together with one 10 mg tab to to make total of 25 mg 2 times a day 180 tablet 1     topiramate (TOPAMAX) 50 MG tablet Take 1 tablet (50 mg) by mouth 2 times daily And 25 mg daily as needed. 225 tablet 1          Mental Status Exam                                                                                   9, 14 cog        Alertness: alert  and oriented  Appearance:  Casually dressed and Well groomed  Behavior/Demeanor: cooperative, pleasant and calm, with good  eye contact   Speech: regular rate and rhythm  Mood :  \"okay\"  Affect: full range and appropriate; was congruent to mood; was congruent to content  Thought Process (Associations):  Logical, Linear and Goal directed  Thought process (Rate):  Normal  Thought content:  no overt psychosis, denies suicidal ideation, intent or thoughts and patient does not appear to be responding to internal stimuli  Perception:  Reports none;  Denies depersonalization and derealization  Attention/Concentration:  Normal  Memory:  Immediate recall intact and Short-term memory intact  Language: intact  Fund of Knowledge/Intelligence:  Average  Abstraction:  Normal  Insight:  Good  Judgment:  Good  Cognition: (6) does  appear grossly intact; formal cognitive testing was not done    Physical Exam     Motor activity/EPS:  Normal  Psychomotor: normal or unremarkable    Labs and Results      Pertinent findings on review include: Review of records with relevant information reported in the HPI.  Reviewed pt's past medical record and obtained collateral information.      [place] PRESCRIPTION MONITORING PROGRAM [] was checked today:  not using controlled substances.    Answers for HPI/ROS submitted by the patient on 2/2/2021   If you checked off any problems, how difficult have these problems made it for you to do your work, take care of things at home, " or get along with other people?: Somewhat difficult  PHQ9 TOTAL SCORE: 8      PHQ9 Today:  8  PHQ 11/26/2019   PHQ-9 Total Score 8   Q9: Thoughts of better off dead/self-harm past 2 weeks Not at all       JERONIMO 7 Today: N/A  No flowsheet data found.    Recent Labs   Lab Test 10/10/19  0820   CR 0.86   GFRESTIMATED 85     Recent Labs   Lab Test 10/10/19  0820   AST 20   ALT 20   ALKPHOS 58       PSYCHOTROPIC DRUG INTERACTIONS:    no.  MANAGEMENT:  N/A    Impression/Assessment      Gaviota Lopez is a 40 year old adult  who presents for med management follow up.  Pt appears stable in her mood and anxiety, denies SI, SIB or HI.  Pt continues to have mood fluctuation on her luteal cycle, but even during that time, it seems the symptoms are fairly well managed.  Takes PRN Topamax infrequently, but when she takes it, it helps.  Since pt is stable, pt wants to continue on current medication regimen, ok to continue on current medication regimen.      Diagnosis                                                                    PMDD  Unspecified eating d/o.  Hx dx of JERONIMO    Treatment Recommendation & Plan       Medication Ordered/Consults/Labs/tests Ordered:     Medication: Continue on current medication regimen.  OTC Recommendations: none  Lab Orders:  none  Referrals: none  Release of Information: none  Future Treatment Considerations: Per symptoms.   Return for Follow Up: in 1 year    -Discussed safety plan for suicidal thoughts  -Discussed plan for suicidality  -Discussed available emergency services  -Patient agrees with the treatment plan  -Encouraged to continue outpatient therapy to gain more coping mechanism for stress.      Treatment Risk Statement: Discussed with the patient my impressions, as well as recommended studies. I educated patient on the differential diagnosis and prognosis. I discussed with the patient the risks and benefits of medications versus no interventions, including efficacy, dose, possible  side effects and length of treatment and the importance of medication compliance.  The patient understands the risks, benefits, adverse effects and alternatives. Agrees to treatment with the capacity to do so. No medical contraindications to treatment. The patient also understands the risks of using street drugs or alcohol.     CRISIS NUMBERS:   Provided routinely in AVS.      28 minutes spent on the date of the encounter doing chart review, history and exam, documentation and further activities as noted above      Pari Duffy CNP,  2/2/2021

## 2021-02-02 NOTE — PATIENT INSTRUCTIONS
-Continue on current medication regimen.    -Follow up in 1 year.        **For crisis resources, please see the information at the end of this document**     Patient Education      Thank you for coming to the [facility] [place] [facility].    Lab Testing:  If you had lab testing today and your results are reassuring or normal they will be mailed to you or sent through [email or web address] within 7 days. If the lab tests need quick action we will call you with the results. The phone number we will call with results is # [phone] (home) . If this is not the best number please call our clinic and change the number.    Medication Refills:  If you need any refills please call your pharmacy and they will contact us. Our fax number for refills is [phone]. Please allow three business for refill processing. If you need to  your refill at a new pharmacy, please contact the new pharmacy directly. The new pharmacy will help you get your medications transferred.     Scheduling:  If you have any concerns about today's visit or wish to schedule another appointment please call our office during normal business hours [phone] (8-5:00 M-F)    Contact Us:  Please call [phone] during business hours (8-5:00 M-F).  If after clinic hours, or on the weekend, please call  [phone].    Financial Assistance [phone]  [facility] Billing [phone]  Central Billing Office, ealth: [phone]  [place] Billing [phone]  Medical Records [phone]  [place] Patient Bill of Rights [email or web address]       MENTAL HEALTH CRISIS NUMBERS:  For a medical emergency please call  911 or go to the nearest ER.     [facility]:   [facility] -[phone]   Crisis Residence [facility] -[phone]   Walk-In Counseling Center [facility] -[phone]   COPE 24/7 [place] Mobile Team -[phone]  (adults)/[phone] (child)  CHILD: [facility] needs assessment team - [phone]      [facility]:   [facility] - [phone]   Walk-in counseling [facility] - [phone]   Walk-in counseling [facility] - [phone]   Crisis Residence [facility] - [phone]  [facility] Adult Mental Csewrp-[phone] mobile unit/ 24/7 crisis line    National Crisis Numbers:   National Suicide Prevention Lifeline: 7-734-832-TALK (937-386-8907)  Poison Control Center - 0-185-077-0637  [employer]/resources for a list of additional resources (SOS)  Trans Lifeline a hotline for transgender people 8-201-644-7068  [employer] a hotline for LGBT youth 0-433-169-4981  Crisis Text Line: For any crisis 24/7   To: 462694  see www.crisistextline.org  - IF MAKING A CALL FEELS TOO HARD, send a text!         Again thank you for choosing [facility] [place] [facility] and please let us know how we can best partner with you to improve you and your family's health.    You may be receiving a survey regarding this appointment. We would love to have your feedback, both positive and negative. The survey is done by an external company, so your answers are anonymous.

## 2021-02-03 ASSESSMENT — PATIENT HEALTH QUESTIONNAIRE - PHQ9: SUM OF ALL RESPONSES TO PHQ QUESTIONS 1-9: 8

## 2021-06-22 NOTE — PROGRESS NOTES
[facility]  Gynecology Consult Visit    SUBJECTIVE    Gaviota Lopez is a 40 year old G0 with a history of PMDD and eating disorder who is here for dysmenorrhea. Gaviota reports that, with her eating disorder, she did not have menstrual periods in her 20s-30s. She began having periods again 5 years ago, but she has had severe dysmenorrhea with every period. She attempted to use Beatris for this, but this worsened her PMDD. Gaviota notes that she has struggled for a long time with PMDD, and is finally in a better place now with multiple medications. She now only takes Aleve for the dysmenorrhea, and this provides only minimal relief of her symptoms. She does report occasional constipation with her periods with nausea that is new in the last six months, but she otherwise denies onset of any other new symptoms, including dysuria. At home, she has tracked her symptoms along with her menstrual periods and notes the symptoms seem to track with the progesterone levels during her cycle. In addition to the above concerns, Gaviota requested routine STI screening.     Her LMP is: 06/28/2021    PAST MEDICAL HISTORY  Past Medical History:   Diagnosis Date     Allergic rhinitis, cause unspecified      Anxiety      Eating disorder     Bulimia     PMDD (premenstrual dysphoric disorder)      Raynaud disease      Subclinical hypothyroidism      Unspecified asthma(493.90)        MEDICATIONS  Current Outpatient Medications   Medication     albuterol (PROAIR RESPICLICK) 108 (90 Base) MCG/ACT inhaler     busPIRone (BUSPAR) 10 MG tablet     busPIRone (BUSPAR) 15 MG tablet     topiramate (TOPAMAX) 50 MG tablet     No current facility-administered medications for this visit.        ALLERGIES  Allergies   Allergen Reactions     Cephalosporins Hives       OBSTETRIC/GYNECOLOGIC HISTORY  Period cycle/length/flow/associated symptoms: No periods from age 20s-30s when struggling with eating disorder. Periods returned in the  "last 5 years, associated with normal flow but severe pain.   Current contraception: none  Sexually active with women.  Lab Results   Component Value Date    PAP NIL 10/10/2019      History of abnormal Pap smear: No    OB History    Para Term  AB Living   0 0 0 0 0 0   SAB TAB Ectopic Multiple Live Births   0 0 0 0 0       PAST SURGICAL HISTORY   Past Surgical History:   Procedure Laterality Date     CLOSED RX CLAVICLE FRACTURE Right     Surgery       SOCIAL HISTORY    Social History     Tobacco Use     Smoking status: Never Smoker     Smokeless tobacco: Never Used   Substance Use Topics     Alcohol use: No     Drug use: No       FAMILY HISTORY    Family History   Problem Relation Age of Onset     C.A.D. Paternal Grandfather      Cancer - colorectal Paternal Grandfather      Diabetes Maternal Grandfather      Hypertension Maternal Grandfather      Diabetes Maternal Uncle      Hypertension Maternal Uncle      No Known Problems Mother      No Known Problems Father      No Known Problems Brother        REVIEW OF SYSTEMS  A 10 point review of systems including Constitutional, Eyes, Respiratory, Cardiovascular, Gastroenterology, Genitourinary, Integumentary, Musculoskeletal, and Psychiatric, were all negative, except for pertinent positives noted in the above HPI.    OBJECTIVE  /72   Pulse 74   Ht 1.626 m (5' 4\")   Wt 52.5 kg (115 lb 12.8 oz)   LMP 2021   BMI 19.88 kg/m      General: Alert, without distress   HEENT: normocephalic, without obvious abnormality   Pelvic: deferred today    ASSESSMENT  Gaviota Lopez is a 40 year old with a history of PMDD who presents today for evaluation of dysmenorrhea.     PLAN    Problem   Menses Painful    Gaviota and I discussed potential causes for painful menses, including primary dysmenorrhea, uterine fibroids, and endometriosis. I recommended an ultrasound to look for structural causes of painful periods.     We then talked about treatment. " First, we discussed scheduled NSAIDs beginning the day before menses. Gaviota is already doing this and will continue to do so. We then discussed how hormonal contraception is a common and useful next step in treatment of painful menses, especially in cases of endometriosis. Gaviota is appropriately cautious with these methods as she has struggled with PMDD for some time. We did review all hormonal methods, but focused on the minipill, the nexplanon implant, and the Mirena IUD. We discussed additional treatments for endometriosis like orlissa and lupron and their side effects. Finally, I offered a diagnostic laparoscopy to look for evidence of endometriosis and adenomyosis.    Gaviota plans to proceed first with an ultrasound. We will continue to discuss her treatment options after the ultrasound is completed.       Gaviota requested STI testing. After discussion of possible exposures, we decided to do gonorrhea and chlamydia screening on urine and throat.    Return for ultrasound.    ROXIE Cazares    OBGYELIANA Attending Addendum    I appreciate the note above by medical student, Oniel Acosta.  I, Estela Vincent, was present with the medical student, who participated in the service and in the documentation of the note. I have verified the history and personally performed the physical exam and medical decision making. I have edited accordingly and agree with the assessment and plan of care as documented in the note.    Estela Vincent MD, MSCI    [facility]  Date of Service: 6/29/21

## 2021-06-28 ASSESSMENT — ENCOUNTER SYMPTOMS
INSOMNIA: 1
DECREASED CONCENTRATION: 1
DEPRESSION: 1
NERVOUS/ANXIOUS: 1
DECREASED LIBIDO: 0
PANIC: 1
HOT FLASHES: 0

## 2021-06-28 ASSESSMENT — ANXIETY QUESTIONNAIRES
2. NOT BEING ABLE TO STOP OR CONTROL WORRYING: NOT AT ALL
1. FEELING NERVOUS, ANXIOUS, OR ON EDGE: SEVERAL DAYS
4. TROUBLE RELAXING: SEVERAL DAYS
7. FEELING AFRAID AS IF SOMETHING AWFUL MIGHT HAPPEN: SEVERAL DAYS
7. FEELING AFRAID AS IF SOMETHING AWFUL MIGHT HAPPEN: SEVERAL DAYS
3. WORRYING TOO MUCH ABOUT DIFFERENT THINGS: SEVERAL DAYS
GAD7 TOTAL SCORE: 6
GAD7 TOTAL SCORE: 6
5. BEING SO RESTLESS THAT IT IS HARD TO SIT STILL: SEVERAL DAYS
6. BECOMING EASILY ANNOYED OR IRRITABLE: SEVERAL DAYS

## 2021-06-29 ENCOUNTER — OFFICE VISIT (OUTPATIENT)
Dept: OBGYN | Facility: CLINIC | Age: 41
End: 2021-06-29
Payer: COMMERCIAL

## 2021-06-29 VITALS
WEIGHT: 115.8 LBS | SYSTOLIC BLOOD PRESSURE: 136 MMHG | HEIGHT: 64 IN | BODY MASS INDEX: 19.77 KG/M2 | HEART RATE: 74 BPM | DIASTOLIC BLOOD PRESSURE: 72 MMHG

## 2021-06-29 DIAGNOSIS — N94.6 MENSES PAINFUL: Primary | ICD-10-CM

## 2021-06-29 DIAGNOSIS — Z11.3 ROUTINE SCREENING FOR STI (SEXUALLY TRANSMITTED INFECTION): ICD-10-CM

## 2021-06-29 PROCEDURE — G0463 HOSPITAL OUTPT CLINIC VISIT: HCPCS

## 2021-06-29 PROCEDURE — 99203 OFFICE O/P NEW LOW 30 MIN: CPT | Performed by: OBSTETRICS & GYNECOLOGY

## 2021-06-29 PROCEDURE — 87591 N.GONORRHOEAE DNA AMP PROB: CPT | Mod: XS | Performed by: OBSTETRICS & GYNECOLOGY

## 2021-06-29 PROCEDURE — 87491 CHLMYD TRACH DNA AMP PROBE: CPT | Performed by: OBSTETRICS & GYNECOLOGY

## 2021-06-29 ASSESSMENT — ANXIETY QUESTIONNAIRES
2. NOT BEING ABLE TO STOP OR CONTROL WORRYING: NOT AT ALL
6. BECOMING EASILY ANNOYED OR IRRITABLE: SEVERAL DAYS
GAD7 TOTAL SCORE: 6
3. WORRYING TOO MUCH ABOUT DIFFERENT THINGS: SEVERAL DAYS
5. BEING SO RESTLESS THAT IT IS HARD TO SIT STILL: SEVERAL DAYS
1. FEELING NERVOUS, ANXIOUS, OR ON EDGE: SEVERAL DAYS
GAD7 TOTAL SCORE: 6
7. FEELING AFRAID AS IF SOMETHING AWFUL MIGHT HAPPEN: SEVERAL DAYS

## 2021-06-29 ASSESSMENT — PATIENT HEALTH QUESTIONNAIRE - PHQ9
SUM OF ALL RESPONSES TO PHQ QUESTIONS 1-9: 8
5. POOR APPETITE OR OVEREATING: SEVERAL DAYS

## 2021-06-29 ASSESSMENT — MIFFLIN-ST. JEOR: SCORE: 1180.27

## 2021-06-29 NOTE — PATIENT INSTRUCTIONS
The Mirena IUD    The Mirena IUD is a small, T-shaped device that is placed into your uterus. It prevents pregnancy for up to 7 years by releasing a hormone called progestin. It is one ofthe most effective form of birth control available. In 1 year, if 100 women use the Mirena IUD for birth control, fewer than 1 of them will become pregnant. What's even better is that it is completely reversible - once it is taken out, you can try to get pregnant right away.  It takes 1 week for Mirena to be effective,so you should use a back up method of birth control, like condoms, for the first week. It's normal that for the first few days after your doctor places the Mirena to have light cramping and light vaginal bleeding. You can use 600mg of ibuprofen every 6 hours as needed for the cramping.  Women who use the Mirena have shorter, lighter periods. They can also have irregular or unpredictable periods. Over time, about one quarter of women will have no periods at all.   It is important to tell your doctor if after you get the Mirena placed you develop a fever, abnormal discharge from your vagina, or pain that isn't controlled with ibuprofen. These could be signs of an infection called Pelvic Inflammatory Disease (PID), an illness that needs to be treated with antibiotics. In about 3% of women, the Mirena can expel (come out). In these cases, you are likely to get heavy vaginal bleeding and pain. It's important that you tell your doctor if you think that your IUD came out, and use a back up method of birth control, like condoms, until you see your doctor.   Remember, the Mirena IUD does not prevent against STDs like gonorrhea or chlamydia - for that, you still need to use a condom.    More Resources:  https://bedsider.org/  http://www.mirena-PixelFish.com/fllfolelbh-dqisl-waiihwdot/     Could consider also: progestin-only pill, Nexplanon implant    Orlissa, Lupron - treatments for endometriosis-related pain    You can consider taking  ibuprofen 600mg every 6 hours on a schedule beginning the 2 days prior to your period/pain starting.

## 2021-06-30 LAB
C TRACH DNA SPEC QL NAA+PROBE: NEGATIVE
C TRACH DNA SPEC QL NAA+PROBE: NEGATIVE
N GONORRHOEA DNA SPEC QL NAA+PROBE: NEGATIVE
N GONORRHOEA DNA SPEC QL NAA+PROBE: NEGATIVE
SPECIMEN SOURCE: NORMAL

## 2021-07-06 PROBLEM — N94.6 MENSES PAINFUL: Status: ACTIVE | Noted: 2021-07-06

## 2021-07-14 ENCOUNTER — HOSPITAL ENCOUNTER (OUTPATIENT)
Dept: ULTRASOUND IMAGING | Facility: CLINIC | Age: 41
Discharge: HOME | End: 2021-07-14
Attending: OBSTETRICS & GYNECOLOGY | Admitting: OBSTETRICS & GYNECOLOGY
Payer: COMMERCIAL

## 2021-07-14 DIAGNOSIS — N94.6 MENSES PAINFUL: ICD-10-CM

## 2021-07-14 PROCEDURE — 76856 US EXAM PELVIC COMPLETE: CPT | Mod: 26 | Performed by: RADIOLOGY

## 2021-07-14 PROCEDURE — 76830 TRANSVAGINAL US NON-OB: CPT | Mod: 26 | Performed by: RADIOLOGY

## 2021-07-14 PROCEDURE — 76830 TRANSVAGINAL US NON-OB: CPT

## 2021-08-31 NOTE — PROGRESS NOTES
[facility]  Gynecology Problem Telephone Visit    Gaviota Lopez is a 41 year old female who is being evaluated via a billable telephone visit.    Patient opted to conduct today's return visit via telephone secondary to the COVID-19 pandemic vs. an in person visit to the clinic.    I spoke with: Gaviota Lopez    SUBJECTIVE    Gaviota Lopez is a 41 year old G0 who is here for followup of painful menses. She had an US on 7/14 which showed a normal uterus and ovaries. She feels her periods are unchanged. She wishes to review the ultrasound findings. She wonders if there is a way to predict when she will enter menopause.     Periods are the same. Mother went into menopause early. Questions about uls.     Leaning towards nothing at this time. mirena iud,other hormonal options - concerns around breast cancer risk from hormonal options, espcially bc of mother's history and has breast dense tissue.     [place] study - hormonal contraceptive outcomes from ocps and implants (RR 1.2 w mirena).     Laparoscopy - not interested at this time. Hysterectomy.     erika Recio - worried about side effects    Her LMP is: No LMP recorded. (Menstrual status: Amenorrhea).    PAST MEDICAL HISTORY  Past Medical History:   Diagnosis Date     Allergic rhinitis, cause unspecified      Anxiety      Eating disorder     Bulimia     PMDD (premenstrual dysphoric disorder)      Raynaud disease      Subclinical hypothyroidism      Unspecified asthma(493.90)        MEDICATIONS  Current Outpatient Medications   Medication     albuterol (PROAIR RESPICLICK) 108 (90 Base) MCG/ACT inhaler     busPIRone (BUSPAR) 10 MG tablet     busPIRone (BUSPAR) 15 MG tablet     topiramate (TOPAMAX) 50 MG tablet     No current facility-administered medications for this visit.       ALLERGIES  Allergies   Allergen Reactions     Cephalosporins Hives       REVIEW OF SYSTEMS  A 10 point review of systems including Constitutional, Eyes,  "Respiratory, Cardiovascular, Gastroenterology, Genitourinary, Integumentary, Musculoskeletal, and Psychiatric, were all negative, except for pertinent positives noted in the above HPI.    OBJECTIVE    IMAGING:  Pelvic US 7/14/21:  IMPRESSION:   Uterus appears to have an arcuate configuration. Otherwise  unremarkable pelvic ultrasound.      ASSESSMENT  Gaviota Lopez is a 41 year old G0 with painful menses, evaluated via telephone visit.    -reviewed ultrasound findings and meaning of arcuate uterus. Discussed that there is no current method for estimating menopause, but that family history is somewhat predictive.  -Reviewed options for managing dysmenorrhea, including hormonal contraception, orlissa/lupron, diagnostic laparoscopy, and hysterectomy. Gaviota prefers watchful waiting at this time as she is concerned about breast cancer risk with hormonal options (though she understands the absolute risk of this outcome is low), symptoms of menopause with orlissa or lupron, and doesn't wish to pursue surgery at this time.  -she understands she can return at any time if she feels her symptoms are worth pursuing treatment.    The patient was notified of following prior to conducting the telephone visit:   \"This telephone visit will be conducted via a call between you and your physician/provider. We have found that certain health care needs can be provided without the need for a physical exam. This service lets us provide the care you need with a short phone conversation. If a prescription is necessary we can send it directly to your pharmacy. If lab work is needed we can place an order for that and you can then stop by our lab to have the test done at a later time. If during the course of the call the physician/provider feels a telephone visit is not appropriate, you will not be charged for this service.\"     Phone call start: 1518  Phone call end: 1530  Phone call duration:  12 minutes    Estela Vincent MD, " MSCI    [facility]

## 2021-09-07 ENCOUNTER — VIRTUAL VISIT (OUTPATIENT)
Dept: OBGYN | Facility: CLINIC | Age: 41
End: 2021-09-07
Attending: OBSTETRICS & GYNECOLOGY
Payer: COMMERCIAL

## 2021-09-07 DIAGNOSIS — N94.6 MENSES PAINFUL: Primary | ICD-10-CM

## 2021-09-07 PROCEDURE — 99213 OFFICE O/P EST LOW 20 MIN: CPT | Mod: 95 | Performed by: OBSTETRICS & GYNECOLOGY

## 2021-09-07 NOTE — LETTER
9/7/2021       RE: Gaviota Lopez  [address]  [place] [address]     Dear Colleague,    Thank you for referring your patient, Gaviota Lopez, to the [facility] [place] at [facility]. Please see a copy of my visit note below.    [facility]  Gynecology Problem Telephone Visit    Gaviota Lopez is a 41 year old female who is being evaluated via a billable telephone visit.    Patient opted to conduct today's return visit via telephone secondary to the COVID-19 pandemic vs. an in person visit to the clinic.    I spoke with: Gaviota Lopez    SUBJECTIVE    Gaviota Lopez is a 41 year old G0 who is here for followup of painful menses. She had an US on 7/14 which showed a normal uterus and ovaries. She feels her periods are unchanged. She wishes to review the ultrasound findings. She wonders if there is a way to predict when she will enter menopause.     Periods are the same. Mother went into menopause early. Questions about uls.     Leaning towards nothing at this time. mirena iud,other hormonal options - concerns around breast cancer risk from hormonal options, espcially bc of mother's history and has breast dense tissue.     [place] study - hormonal contraceptive outcomes from ocps and implants (RR 1.2 w mirena).     Laparoscopy - not interested at this time. Hysterectomy.     erika Recio - worried about side effects    Her LMP is: No LMP recorded. (Menstrual status: Amenorrhea).    PAST MEDICAL HISTORY  Past Medical History:   Diagnosis Date     Allergic rhinitis, cause unspecified      Anxiety      Eating disorder     Bulimia     PMDD (premenstrual dysphoric disorder)      Raynaud disease      Subclinical hypothyroidism      Unspecified asthma(493.90)        MEDICATIONS  Current Outpatient Medications   Medication     albuterol (PROAIR RESPICLICK) 108 (90 Base) MCG/ACT inhaler      "busPIRone (BUSPAR) 10 MG tablet     busPIRone (BUSPAR) 15 MG tablet     topiramate (TOPAMAX) 50 MG tablet     No current facility-administered medications for this visit.       ALLERGIES  Allergies   Allergen Reactions     Cephalosporins Hives       REVIEW OF SYSTEMS  A 10 point review of systems including Constitutional, Eyes, Respiratory, Cardiovascular, Gastroenterology, Genitourinary, Integumentary, Musculoskeletal, and Psychiatric, were all negative, except for pertinent positives noted in the above HPI.    OBJECTIVE    IMAGING:  Pelvic US 7/14/21:  IMPRESSION:   Uterus appears to have an arcuate configuration. Otherwise  unremarkable pelvic ultrasound.      ASSESSMENT  Gaviota Lopez is a 41 year old G0 with painful menses, evaluated via telephone visit.    -reviewed ultrasound findings and meaning of arcuate uterus. Discussed that there is no current method for estimating menopause, but that family history is somewhat predictive.  -Reviewed options for managing dysmenorrhea, including hormonal contraception, orlissa/lupron, diagnostic laparoscopy, and hysterectomy. Gaviota prefers watchful waiting at this time as she is concerned about breast cancer risk with hormonal options (though she understands the absolute risk of this outcome is low), symptoms of menopause with orlissa or lupron, and doesn't wish to pursue surgery at this time.  -she understands she can return at any time if she feels her symptoms are worth pursuing treatment.    The patient was notified of following prior to conducting the telephone visit:   \"This telephone visit will be conducted via a call between you and your physician/provider. We have found that certain health care needs can be provided without the need for a physical exam. This service lets us provide the care you need with a short phone conversation. If a prescription is necessary we can send it directly to your pharmacy. If lab work is needed we can place an order for " "that and you can then stop by our lab to have the test done at a later time. If during the course of the call the physician/provider feels a telephone visit is not appropriate, you will not be charged for this service.\"     Phone call start: 1518  Phone call end: 1530  Phone call duration:  12 minutes    Estela Vincent MD, MSCI    [facility]      "

## 2021-09-29 ENCOUNTER — TRANSFERRED RECORDS (OUTPATIENT)
Dept: HEALTH INFORMATION MANAGEMENT | Facility: CLINIC | Age: 41
End: 2021-09-29

## 2021-10-08 NOTE — TELEPHONE ENCOUNTER
On 11/18/2019 the patient signed an MICHAEL authorizing medical records to be released from [facility] to [facility]  for the purpose of continuing care. I faxed the request to [phone], [phone]. I sent the original to MICHAEL to scanning and kept a copy in psychiatry until scanning is complete.  Jillian Lewis, CMA    
Attending Only

## 2021-10-25 ENCOUNTER — MYC MEDICAL ADVICE (OUTPATIENT)
Dept: FAMILY MEDICINE | Facility: CLINIC | Age: 41
End: 2021-10-25

## 2021-10-25 DIAGNOSIS — Z00.00 ROUTINE GENERAL MEDICAL EXAMINATION AT A HEALTH CARE FACILITY: ICD-10-CM

## 2021-10-25 DIAGNOSIS — Z13.1 SCREENING FOR DIABETES MELLITUS: ICD-10-CM

## 2021-10-25 DIAGNOSIS — E03.8 SUBCLINICAL HYPOTHYROIDISM: Primary | ICD-10-CM

## 2021-10-25 DIAGNOSIS — Z13.220 SCREENING FOR HYPERLIPIDEMIA: ICD-10-CM

## 2021-10-27 ENCOUNTER — LAB (OUTPATIENT)
Dept: LAB | Facility: CLINIC | Age: 41
End: 2021-10-27
Payer: COMMERCIAL

## 2021-10-27 DIAGNOSIS — Z00.00 ROUTINE GENERAL MEDICAL EXAMINATION AT A HEALTH CARE FACILITY: ICD-10-CM

## 2021-10-27 DIAGNOSIS — E03.8 SUBCLINICAL HYPOTHYROIDISM: ICD-10-CM

## 2021-10-27 DIAGNOSIS — Z13.220 SCREENING FOR HYPERLIPIDEMIA: ICD-10-CM

## 2021-10-27 DIAGNOSIS — Z13.1 SCREENING FOR DIABETES MELLITUS: ICD-10-CM

## 2021-10-27 LAB
CHOLEST SERPL-MCNC: 234 MG/DL
FASTING STATUS PATIENT QL REPORTED: YES
FASTING STATUS PATIENT QL REPORTED: YES
GLUCOSE BLD-MCNC: 79 MG/DL (ref 70–125)
HDLC SERPL-MCNC: 63 MG/DL
HGB BLD-MCNC: 13.6 G/DL (ref 11.7–15.7)
LDLC SERPL CALC-MCNC: 154 MG/DL
T4 FREE SERPL-MCNC: 1.01 NG/DL (ref 0.7–1.8)
TRIGL SERPL-MCNC: 85 MG/DL
TSH SERPL DL<=0.005 MIU/L-ACNC: 5.52 UIU/ML (ref 0.3–5)

## 2021-10-27 PROCEDURE — 82947 ASSAY GLUCOSE BLOOD QUANT: CPT

## 2021-10-27 PROCEDURE — 84443 ASSAY THYROID STIM HORMONE: CPT

## 2021-10-27 PROCEDURE — 84439 ASSAY OF FREE THYROXINE: CPT

## 2021-10-27 PROCEDURE — 86376 MICROSOMAL ANTIBODY EACH: CPT

## 2021-10-27 PROCEDURE — 36415 COLL VENOUS BLD VENIPUNCTURE: CPT

## 2021-10-27 PROCEDURE — 80061 LIPID PANEL: CPT

## 2021-10-27 PROCEDURE — 86800 THYROGLOBULIN ANTIBODY: CPT

## 2021-10-27 PROCEDURE — 85018 HEMOGLOBIN: CPT

## 2021-10-28 LAB
THYROGLOB AB SERPL IA-ACNC: <20 IU/ML
THYROPEROXIDASE AB SERPL-ACNC: 3 IU/ML (ref 0–6)

## 2021-11-08 ASSESSMENT — ANXIETY QUESTIONNAIRES
GAD7 TOTAL SCORE: 5
8. IF YOU CHECKED OFF ANY PROBLEMS, HOW DIFFICULT HAVE THESE MADE IT FOR YOU TO DO YOUR WORK, TAKE CARE OF THINGS AT HOME, OR GET ALONG WITH OTHER PEOPLE?: NOT DIFFICULT AT ALL
5. BEING SO RESTLESS THAT IT IS HARD TO SIT STILL: SEVERAL DAYS
7. FEELING AFRAID AS IF SOMETHING AWFUL MIGHT HAPPEN: NOT AT ALL
1. FEELING NERVOUS, ANXIOUS, OR ON EDGE: SEVERAL DAYS
GAD7 TOTAL SCORE: 5
4. TROUBLE RELAXING: SEVERAL DAYS
3. WORRYING TOO MUCH ABOUT DIFFERENT THINGS: SEVERAL DAYS
GAD7 TOTAL SCORE: 5
6. BECOMING EASILY ANNOYED OR IRRITABLE: SEVERAL DAYS
7. FEELING AFRAID AS IF SOMETHING AWFUL MIGHT HAPPEN: NOT AT ALL
2. NOT BEING ABLE TO STOP OR CONTROL WORRYING: NOT AT ALL

## 2021-11-09 ENCOUNTER — OFFICE VISIT (OUTPATIENT)
Dept: FAMILY MEDICINE | Facility: CLINIC | Age: 41
End: 2021-11-09
Payer: COMMERCIAL

## 2021-11-09 ENCOUNTER — LAB (OUTPATIENT)
Dept: LAB | Facility: CLINIC | Age: 41
End: 2021-11-09
Payer: COMMERCIAL

## 2021-11-09 VITALS
SYSTOLIC BLOOD PRESSURE: 130 MMHG | WEIGHT: 112 LBS | OXYGEN SATURATION: 100 % | HEIGHT: 64 IN | DIASTOLIC BLOOD PRESSURE: 77 MMHG | BODY MASS INDEX: 19.12 KG/M2 | HEART RATE: 82 BPM

## 2021-11-09 DIAGNOSIS — Z11.4 SCREENING FOR HIV (HUMAN IMMUNODEFICIENCY VIRUS): ICD-10-CM

## 2021-11-09 DIAGNOSIS — Z11.59 NEED FOR HEPATITIS C SCREENING TEST: ICD-10-CM

## 2021-11-09 DIAGNOSIS — Z00.00 ROUTINE GENERAL MEDICAL EXAMINATION AT A HEALTH CARE FACILITY: Primary | ICD-10-CM

## 2021-11-09 DIAGNOSIS — E03.8 SUBCLINICAL HYPOTHYROIDISM: ICD-10-CM

## 2021-11-09 LAB
HCV AB SERPL QL IA: NONREACTIVE
HIV 1+2 AB+HIV1 P24 AG SERPL QL IA: NONREACTIVE

## 2021-11-09 PROCEDURE — 99396 PREV VISIT EST AGE 40-64: CPT | Performed by: NURSE PRACTITIONER

## 2021-11-09 PROCEDURE — 99000 SPECIMEN HANDLING OFFICE-LAB: CPT | Performed by: PATHOLOGY

## 2021-11-09 PROCEDURE — 86800 THYROGLOBULIN ANTIBODY: CPT | Mod: 90 | Performed by: PATHOLOGY

## 2021-11-09 PROCEDURE — 87389 HIV-1 AG W/HIV-1&-2 AB AG IA: CPT | Mod: 90 | Performed by: PATHOLOGY

## 2021-11-09 PROCEDURE — 86803 HEPATITIS C AB TEST: CPT | Mod: 90 | Performed by: PATHOLOGY

## 2021-11-09 PROCEDURE — 36415 COLL VENOUS BLD VENIPUNCTURE: CPT | Performed by: PATHOLOGY

## 2021-11-09 ASSESSMENT — PAIN SCALES - GENERAL: PAINLEVEL: NO PAIN (0)

## 2021-11-09 ASSESSMENT — ANXIETY QUESTIONNAIRES: GAD7 TOTAL SCORE: 5

## 2021-11-09 ASSESSMENT — MIFFLIN-ST. JEOR: SCORE: 1158.03

## 2021-11-09 NOTE — NURSING NOTE
Chief Complaint   Patient presents with     Physical     general check up       BRENDA Hoffman at 8:57 AM on 11/9/2021

## 2021-11-10 LAB — THYROGLOB AB SERPL IA-ACNC: <20 IU/ML

## 2021-11-10 ASSESSMENT — ASTHMA QUESTIONNAIRES: ACT_TOTALSCORE: 25

## 2021-12-06 DIAGNOSIS — F32.81 PMDD (PREMENSTRUAL DYSPHORIC DISORDER): ICD-10-CM

## 2021-12-08 RX ORDER — BUSPIRONE HYDROCHLORIDE 10 MG/1
TABLET ORAL
Qty: 180 TABLET | Refills: 0 | Status: SHIPPED | OUTPATIENT
Start: 2021-12-08 | End: 2022-02-01

## 2021-12-08 RX ORDER — BUSPIRONE HYDROCHLORIDE 15 MG/1
TABLET ORAL
Qty: 180 TABLET | Refills: 0 | Status: SHIPPED | OUTPATIENT
Start: 2021-12-08 | End: 2022-02-01

## 2021-12-08 NOTE — TELEPHONE ENCOUNTER
Medication requested:   busPIRone 10 mg tablet (BUSPAR)  busPIRone 15 mg tablet (BUSPAR)  Last refilled: 9/11/21  Qty: 180      Last seen: 2/2/21  RTC: 1 year  Cancel: 0  No-show: 0  Next appt: 2/1/22    Refill decision:   Refilled for 30 days per protocol.

## 2022-02-01 ENCOUNTER — VIRTUAL VISIT (OUTPATIENT)
Dept: PSYCHIATRY | Facility: CLINIC | Age: 42
End: 2022-02-01
Attending: NURSE PRACTITIONER
Payer: COMMERCIAL

## 2022-02-01 DIAGNOSIS — F50.9 EATING DISORDER, UNSPECIFIED TYPE: ICD-10-CM

## 2022-02-01 DIAGNOSIS — F32.81 PMDD (PREMENSTRUAL DYSPHORIC DISORDER): Primary | ICD-10-CM

## 2022-02-01 PROCEDURE — 99214 OFFICE O/P EST MOD 30 MIN: CPT | Mod: 95 | Performed by: NURSE PRACTITIONER

## 2022-02-01 RX ORDER — BUSPIRONE HYDROCHLORIDE 10 MG/1
TABLET ORAL
Qty: 180 TABLET | Refills: 3 | Status: SHIPPED | OUTPATIENT
Start: 2022-02-01 | End: 2023-02-07

## 2022-02-01 RX ORDER — TOPIRAMATE 50 MG/1
TABLET, FILM COATED ORAL
Qty: 225 TABLET | Refills: 3 | Status: SHIPPED | OUTPATIENT
Start: 2022-02-01 | End: 2023-02-07

## 2022-02-01 RX ORDER — BUSPIRONE HYDROCHLORIDE 15 MG/1
TABLET ORAL
Qty: 180 TABLET | Refills: 3 | Status: SHIPPED | OUTPATIENT
Start: 2022-02-01 | End: 2023-02-07

## 2022-02-01 ASSESSMENT — PAIN SCALES - GENERAL: PAINLEVEL: NO PAIN (0)

## 2022-02-01 NOTE — PROGRESS NOTES
"VIDEO VISIT  Gaviota Lopez is a 41 year old patient that has consented to receive services via billable video visit.      The patient has been notified of following:   \"This video visit will be conducted via a call between you and your physician/provider. We have found that certain health care needs can be provided without the need for an in-person physical exam. This service lets us provide the care you need with a video conversation. If a prescription is necessary we can send it directly to your pharmacy. If lab work is needed we can place an order for that and you can then stop by our lab to have the test done at a later time. Insurers are generally covering virtual visits as they would in-office visits so billing should not be different than normal.  If for some reason you do get billed incorrectly, you should contact the billing office to correct it and that number is in the AVS .    Patient will join video visit via:  [employer] (Patient / guardian confirmed to join via Heroku)    If patient attempts to join the video via Heroku at appointment start time, but is unable to, they would prefer that the provider send them a video invitation via:   Send to preferred e-mail: [email or web address]      How would patient like to obtain AVS?:  MyChart    "

## 2022-02-01 NOTE — PATIENT INSTRUCTIONS
-Continue on current medication regimen.    -Follow up in 1 year.      **For crisis resources, please see the information at the end of this document**   Patient Education    Thank you for coming to the [facility] [address] [facility].    Lab Testing:  If you had lab testing today and your results are reassuring or normal they will be mailed to you or sent through Odilo within 7 days. If the lab tests need quick action we will call you with the results. The phone number we will call with results is # [phone] (home) . If this is not the best number please call our clinic and change the number.    Medication Refills:  If you need any refills please call your pharmacy and they will contact us. Our fax number for refills is [phone]. Please allow three business for refill processing. If you need to  your refill at a new pharmacy, please contact the new pharmacy directly. The new pharmacy will help you get your medications transferred.     Scheduling:  If you have any concerns about today's visit or wish to schedule another appointment please call our office during normal business hours [phone] (8-5:00 M-F)    Contact Us:  Please call [phone] during business hours (8-5:00 M-F).  If after clinic hours, or on the weekend, please call  [phone].    Financial Assistance [phone]  [facility] B[facility] [phone]  Central Billing Office, ealth: [phone]  [facility]illing [phone]  Medical Records [phone]  [facility] Patient Bill of Rights [email or web address]       MENTAL HEALTH CRISIS NUMBERS:  For a medical emergency please call  911 or go to the nearest ER.     [facility]:   [facility] -[phone]   Crisis Residence [facility] Residence -[phone]   Walk-In Counseling Center [facility] -[phone]   COPE 24/7 [place] Mobile Team -[phone]  (adults)/[phone] (child)  CHILD: [facility] needs assessment team - [phone]      [facility]:   [facility] - [phone]   Walk-in counseling [facility] - [phone]   Walk-in counseling [facility] - [phone]   Crisis Residence [facility] Residence - [phone]  Urgent Care Adult Mental Fkjgcq-[phone] mobile unit/ 24/7 crisis line    National Crisis Numbers:   National Suicide Prevention Lifeline: 2-383-237-TALK (597-291-3698)  Poison Control Center - 2-927-929-1401  "[employer]/resources for a list of additional resources (SOS)  Trans Lifeline a hotline for transgender people 4-476-005-7061  The [employer] a hotline for LGBT youth 8-808-795-1897  Crisis Text Line: For any crisis 24/7   To: 237834  see www.crisistextline.org  - IF MAKING A CALL FEELS TOO HARD, send a text!         Again thank you for choosing [facility] [place] [facility] and please let us know how we can best partner with you to improve you and your family's health.    You may be receiving a survey regarding this appointment. We would love to have your feedback, both positive and negative. The survey is done by an external company, so your answers are anonymous.

## 2022-02-01 NOTE — PROGRESS NOTES
"VIDEO VISIT  Gaviota Lopez is a 41 year old patient who is being evaluated via a billable video visit.      The patient has been notified of following:   \"This video visit will be conducted via a call between you and your physician/provider. We have found that certain health care needs can be provided without the need for an in-person physical exam. This service lets us provide the care you need with a video conversation. If a prescription is necessary we can send it directly to your pharmacy. If lab work is needed we can place an order for that and you can then stop by our lab to have the test done at a later time. Insurers are generally covering virtual visits as they would in-office visits so billing should not be different than normal.  If for some reason you do get billed incorrectly, you should contact the billing office to correct it and that number is in the AVS .    Video Conference to be completed via:  Danae    Patient has given verbal consent for video visit?:  Yes    Patient would prefer that any video invitations be sent by: Send to e-mail at: [email or web address]      How would patient like to obtain AVS?:  [facility]    AVS SmartPhrase [PsychAVS] has been placed in 'Patient Instructions':  Yes     Start Time:  1530         End Time: 1551    Telemedicine Visit: The patient's condition can be safely assessed and treated via synchronous audio and visual telemedicine encounter.      Reason for Telemedicine Visit: Due to COVID 19 pandemic, clinic switching all appointments to telemedicine     Originating Site (Patient Location): Patient's home    Distant Site (Provider Location): Provider Remote Setting    Consent:  The patient/guardian has verbally consented to: the potential risks and benefits of telemedicine (video visit) versus in person care; bill my insurance or make self-payment for services provided; and responsibility for payment of non-covered services.     Mode of Communication:  " Video Conference via VONTRAVEL    As the provider I attest to compliance with applicable laws and regulations related to telemedicine.    Psychiatry Clinic Progress Note                                                                  Patient Name: Gaviota Lopez  YOB: 1980  MRN: [id number]  Date of Service: 02/01/2022  Last Seen: 2/2/2021    Gaviota Lopez is a 41 year old person assigned female at birth, identifies as cisgender female who uses the name Gaviota and pronoun she.      Gaviota Lopez is a 41 year old year old adult who presents for ongoing psychiatric care.  Gaviota Lopze was last seen on  2/2/2021.    At that time,     Medication Ordered/Consults/Labs/tests Ordered:     Medication: Continue on current medication regimen.  OTC Recommendations: none  Lab Orders:  none  Referrals: none  Release of Information: none  Future Treatment Considerations: Per symptoms.   Return for Follow Up: in 1 year    Pertinent Background:  Diagnoses include PMDD, anxiety, depression.  Psych critical item history includes mutiple psychotropic trials and eating disorder (Bullimia).      Previous Psychiatric Meds: Ativan (helpful only when menstrual cycle can be predictable, also built tolerance over time), Beatris (mood exacerbation), Zoloft (not effective), Prozac (not effective), Trazodone (oversedation at 100 mg, not effective at 50mg), Pristiq (not effective), elavil (helped sleep only), Vyvance (helpful for anxiety at 20 mg in AM, but sleep cycle worsening), Buspar (increased anxiety at 30 mg BID)    Interim History                                                                                                        4, 4     Since the last visit,  -Continues to do well.  Anxiety and mood are mostly well managed, denies SI, SIB or HI.  -Some fluctuation on anxiety during luteal phase, but manageable.  Continues to have monthly menses.  -Has been taking Topamax 100 mg HS instead of 50  mg BID due to oversedation during daytime.  -Takes PRN Topamax 25 mg x2-3/month during luteal phase.  -Wants to continue on current medication regimen as she has been stable.    Denies any symptoms suggestive of hypomania or psychosis.    Current Suicidality/Hx of Suicide Attempts: Denies both  CoCominent Medical concerns: none    Medication Side Effects: The patient denies all medication side effects.      Medical Review of Systems     Apart from the symptoms mentioned int he HPI, the 14 point review of systems, including constitutional, HEENT, cardiovascular, respiratory, gastrointestinal, genitourinary, musculoskeletal, integumentary, endocrine, neurological, hematologic and allergic is entirely negative.    Pregnant: None. Nursing: None, Contraception: not currently sexually active      Substance Use   Pt has been staying substance free since last seen.      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    Allergy                                Cephalosporins    Current Medications                                                                                                       Current Outpatient Medications   Medication Sig Dispense Refill     albuterol (PROAIR RESPICLICK) 108 (90 Base) MCG/ACT inhaler Inhale 2 puffs into the lungs every 6 hours as needed for shortness of breath / dyspnea or wheezing 1 Inhaler 0     busPIRone (BUSPAR) 10 MG tablet Take 1 tablet by mouth 2 times a day. Take with one 15 mg tablet to make total of 25 mg 2 times a day 180 tablet 0     busPIRone (BUSPAR) 15 MG tablet Take 1 tablet by mouth 2 times a day. Take with one 10 mg tab to make total of 25 mg 2 times a day. 180 tablet 0     topiramate (TOPAMAX) 50 MG tablet Take 1 tab 2 times a day  "and 25 mg daily as needed. 225 tablet 2          Mental Status Exam                                                                                   9, 14 cog        Alertness: alert  and oriented  Appearance:  Casually dressed and Well groomed  Behavior/Demeanor: cooperative, pleasant and calm, with good  eye contact   Speech: regular rate and rhythm  Mood :  \"good\"  Affect: full range and appropriate; was congruent to mood; was congruent to content  Thought Process (Associations):  Logical, Linear and Goal directed  Thought process (Rate):  Normal  Thought content:  no overt psychosis, denies suicidal ideation, intent or thoughts and patient does not appear to be responding to internal stimuli  Perception:  Reports none;  Denies depersonalization and derealization  Attention/Concentration:  Normal  Memory:  Immediate recall intact and Short-term memory intact  Language: intact  Fund of Knowledge/Intelligence:  Average  Abstraction:  Normal  Insight:  Good  Judgment:  Good  Cognition: (6) does  appear grossly intact; formal cognitive testing was not done    Physical Exam     Motor activity/EPS:  Normal  Psychomotor: normal or unremarkable    Labs and Results      Pertinent findings on review include: Review of records with relevant information reported in the HPI.  Reviewed pt's past medical record and obtained collateral information.      [place] PRESCRIPTION MONITORING PROGRAM [] was checked today:  not using controlled substances.    Answers for HPI/ROS submitted by the patient on 2/2/2021   If you checked off any problems, how difficult have these problems made it for you to do your work, take care of things at home, or get along with other people?: Somewhat difficult  PHQ9 TOTAL SCORE: 8      PHQ9 Today:  8  PHQ 11/26/2019 2/2/2021 6/29/2021   PHQ-9 Total Score 8 8 8   Q9: Thoughts of better off dead/self-harm past 2 weeks Not at all Not at all Not at all       JERONIMO 7 Today: N/A  JERONIMO-7 SCORE 6/28/2021 6/29/2021 " 11/8/2021   Total Score 6 (mild anxiety) - 5 (mild anxiety)   Total Score 6 6 5       Recent Labs   Lab Test 10/10/19  0820   CR 0.86   GFRESTIMATED 85     Recent Labs   Lab Test 10/10/19  0820   AST 20   ALT 20   ALKPHOS 58     TSH: 5.52 (10/27/2021)    PSYCHOTROPIC DRUG INTERACTIONS:    no.  MANAGEMENT:  N/A    Impression/Assessment      Gaviota Lopez is a 41 year old adult  who presents for med management follow up.  Pt appears stable in her mood and anxiety, denies SI, SIB or HI.  Pt continues to have mood fluctuation on her luteal cycle, but even during that time, it seems the symptoms are fairly well managed.  Takes PRN Topamax infrequently, but when she takes it, it helps.  Since pt is stable, pt wants to continue on current medication regimen, ok to continue on current medication regimen.  Topamax is changed to 100 mg HS from 50 mg BID as this is how she has been taking.  Will continue PRN Topamax 25 mg daily PRN for anxiety.    Diagnosis                                                                    PMDD  Unspecified eating d/o.  Hx dx of JERONIMO    Treatment Recommendation & Plan       Medication Ordered/Consults/Labs/tests Ordered:     Medication: Continue on current medication regimen.  OTC Recommendations: none  Lab Orders:  none  Referrals: none  Release of Information: none  Future Treatment Considerations: Per symptoms.   Return for Follow Up: in 1 year    -Discussed safety plan for suicidal thoughts  -Discussed plan for suicidality  -Discussed available emergency services  -Patient agrees with the treatment plan  -Encouraged to continue outpatient therapy to gain more coping mechanism for stress.      Treatment Risk Statement: Discussed with the patient my impressions, as well as recommended studies. I educated patient on the differential diagnosis and prognosis. I discussed with the patient the risks and benefits of medications versus no interventions, including efficacy, dose, possible side  effects and length of treatment and the importance of medication compliance.  The patient understands the risks, benefits, adverse effects and alternatives. Agrees to treatment with the capacity to do so. No medical contraindications to treatment. The patient also understands the risks of using street drugs or alcohol.     CRISIS NUMBERS:   Provided routinely in AVS.    Pari Duffy CNP,  02/01/2022

## 2022-08-01 NOTE — TELEPHONE ENCOUNTER
[facility] VASC SURGERY  [address]  [address]  [facility] [address]  [phone]    08/01/22    Regarding Patient:  Geeta Martin  [address]  [facility] [address]    To Whom It Concerns:    This is to certify Geeta Martin was evaluated at [facility] GEN [facility] VASC SURGERY on 8/1/22.  She was provided with the following work recommendations:     May return to work on 8/11/22 with lifting restrictions through 8/31/22of no lifting over 15 pounds         Please call us at [phone] or toll free at [phone] if you have any questions.  We will be happy to assist you.        Quinton Guerin MD     PSYCHIATRY CLINIC PHONE INTAKE     SERVICES REQUESTED / INTERESTED IN          Med Management    Presenting Problem and Brief History                              What would you like to be seen for? (brief description):  Patient is calling in regarding medication management for her anxiety. Patient has been diagnosed with anxiety for 20 years. Patient is currently being medicated at this time, although that is at [facility] where patient has been in remission from her eating disorder for some time, and is wanting to seek services elsewhere. Patient is wanting a provider who does know of PMDD, and can treat as that is a heavy concern for patient at this time.     Have you received a mental health diagnosis? Yes   Which one (s): Anxiety, Bulimia, Depression, PMDD.   Is there any history of developmental delay?  No   Are you currently seeing a mental health provider?  Yes            Who / month last seen:  [facility] Zulema FERMIN   Do you have mental health records elsewhere?  Yes  Will you sign a release so we can obtain them?  Yes    Have you ever been hospitalized for psychiatric reasons?  No  Describe:  N/A    Do you have current thoughts of self-harm?  No    Do you currently have thoughts of harming others?  No       Substance Use History     Do you have any history of alcohol / illicit drug use?  No  Describe:  N/A  Have you ever received treatment for this?  No    Describe:  N/A     Social History     Does the patient have a guardian?  No    Name / number: N/A  Have you had an ACT team in last 12 months?  No  Describe: N/A   Do you have any current or past legal issues?  No  Describe: N/A   OK to leave a detailed voicemail?  Yes    Medical/ Surgical History                                   Patient Active Problem List   Diagnosis     Amenorrhea          Medications             Current Outpatient Medications   Medication Sig Dispense Refill     atorvastatin (LIPITOR) 40 MG tablet Take 1 tablet (40 mg) by  mouth daily 90 tablet 3     B Complex-Biotin-FA (B-100 COMPLEX PO)        Cholecalciferol (VITAMIN D-3 PO)        sertraline (ZOLOFT) 50 MG tablet Take by mouth daily           DISPOSITION      Patient has been added to the NP and resident wait list.   Provider skilled in PMDD   Jonas Rosenthal

## 2022-10-15 ENCOUNTER — HEALTH MAINTENANCE LETTER (OUTPATIENT)
Age: 42
End: 2022-10-15

## 2022-11-15 ENCOUNTER — OFFICE VISIT (OUTPATIENT)
Dept: FAMILY MEDICINE | Facility: CLINIC | Age: 42
End: 2022-11-15
Payer: COMMERCIAL

## 2022-11-15 ENCOUNTER — LAB (OUTPATIENT)
Dept: LAB | Facility: CLINIC | Age: 42
End: 2022-11-15
Payer: COMMERCIAL

## 2022-11-15 VITALS
DIASTOLIC BLOOD PRESSURE: 67 MMHG | RESPIRATION RATE: 15 BRPM | HEIGHT: 64 IN | OXYGEN SATURATION: 100 % | BODY MASS INDEX: 19.36 KG/M2 | SYSTOLIC BLOOD PRESSURE: 110 MMHG | TEMPERATURE: 97.7 F | WEIGHT: 113.4 LBS | HEART RATE: 55 BPM

## 2022-11-15 DIAGNOSIS — L60.8 TOENAIL DEFORMITY: ICD-10-CM

## 2022-11-15 DIAGNOSIS — Z13.1 SCREENING FOR DIABETES MELLITUS: ICD-10-CM

## 2022-11-15 DIAGNOSIS — E03.8 SUBCLINICAL HYPOTHYROIDISM: ICD-10-CM

## 2022-11-15 DIAGNOSIS — Z00.00 ROUTINE HISTORY AND PHYSICAL EXAMINATION OF ADULT: ICD-10-CM

## 2022-11-15 DIAGNOSIS — Z13.220 SCREENING FOR HYPERLIPIDEMIA: ICD-10-CM

## 2022-11-15 DIAGNOSIS — Z00.00 ROUTINE HISTORY AND PHYSICAL EXAMINATION OF ADULT: Primary | ICD-10-CM

## 2022-11-15 LAB
CHOLEST SERPL-MCNC: 207 MG/DL
FASTING STATUS PATIENT QL REPORTED: YES
GLUCOSE SERPL-MCNC: 88 MG/DL (ref 70–99)
HDLC SERPL-MCNC: 59 MG/DL
HGB BLD-MCNC: 13 G/DL (ref 11.7–15.7)
LDLC SERPL CALC-MCNC: 131 MG/DL
NONHDLC SERPL-MCNC: 148 MG/DL
T4 FREE SERPL-MCNC: 1.12 NG/DL (ref 0.9–1.7)
THYROGLOB AB SERPL IA-ACNC: <20 IU/ML
THYROPEROXIDASE AB SERPL-ACNC: <10 IU/ML
TRIGL SERPL-MCNC: 87 MG/DL
TSH SERPL DL<=0.005 MIU/L-ACNC: 4.47 UIU/ML (ref 0.3–4.2)

## 2022-11-15 PROCEDURE — 80061 LIPID PANEL: CPT | Performed by: PATHOLOGY

## 2022-11-15 PROCEDURE — 85018 HEMOGLOBIN: CPT | Performed by: PATHOLOGY

## 2022-11-15 PROCEDURE — 99396 PREV VISIT EST AGE 40-64: CPT | Performed by: NURSE PRACTITIONER

## 2022-11-15 PROCEDURE — 84443 ASSAY THYROID STIM HORMONE: CPT | Performed by: PATHOLOGY

## 2022-11-15 PROCEDURE — 86800 THYROGLOBULIN ANTIBODY: CPT | Performed by: PATHOLOGY

## 2022-11-15 PROCEDURE — 36415 COLL VENOUS BLD VENIPUNCTURE: CPT | Performed by: PATHOLOGY

## 2022-11-15 PROCEDURE — 82947 ASSAY GLUCOSE BLOOD QUANT: CPT | Performed by: PATHOLOGY

## 2022-11-15 PROCEDURE — 86376 MICROSOMAL ANTIBODY EACH: CPT | Performed by: PATHOLOGY

## 2022-11-15 PROCEDURE — 84439 ASSAY OF FREE THYROXINE: CPT | Performed by: PATHOLOGY

## 2022-11-15 PROCEDURE — 99000 SPECIMEN HANDLING OFFICE-LAB: CPT | Performed by: PATHOLOGY

## 2022-11-15 RX ORDER — FLUTICASONE PROPIONATE 50 MCG
1 SPRAY, SUSPENSION (ML) NASAL DAILY
COMMUNITY

## 2022-11-15 RX ORDER — LORATADINE 10 MG/1
10 TABLET ORAL DAILY
COMMUNITY

## 2022-11-15 ASSESSMENT — ANXIETY QUESTIONNAIRES
1. FEELING NERVOUS, ANXIOUS, OR ON EDGE: MORE THAN HALF THE DAYS
5. BEING SO RESTLESS THAT IT IS HARD TO SIT STILL: SEVERAL DAYS
7. FEELING AFRAID AS IF SOMETHING AWFUL MIGHT HAPPEN: SEVERAL DAYS
6. BECOMING EASILY ANNOYED OR IRRITABLE: SEVERAL DAYS
2. NOT BEING ABLE TO STOP OR CONTROL WORRYING: MORE THAN HALF THE DAYS
IF YOU CHECKED OFF ANY PROBLEMS ON THIS QUESTIONNAIRE, HOW DIFFICULT HAVE THESE PROBLEMS MADE IT FOR YOU TO DO YOUR WORK, TAKE CARE OF THINGS AT HOME, OR GET ALONG WITH OTHER PEOPLE: SOMEWHAT DIFFICULT
GAD7 TOTAL SCORE: 9
3. WORRYING TOO MUCH ABOUT DIFFERENT THINGS: SEVERAL DAYS
GAD7 TOTAL SCORE: 9

## 2022-11-15 ASSESSMENT — PATIENT HEALTH QUESTIONNAIRE - PHQ9
SUM OF ALL RESPONSES TO PHQ QUESTIONS 1-9: 8
5. POOR APPETITE OR OVEREATING: SEVERAL DAYS

## 2022-11-15 ASSESSMENT — PAIN SCALES - GENERAL: PAINLEVEL: NO PAIN (0)

## 2022-11-15 NOTE — NURSING NOTE
"Chief Complaint   Patient presents with     Physical     Blood pressure 110/67, pulse 55, temperature 97.7  F (36.5  C), temperature source Oral, resp. rate 15, height 1.626 m (5' 4\"), weight 51.4 kg (113 lb 6.4 oz), SpO2 100 %.    Yokasta Trejo    "

## 2022-11-15 NOTE — PROGRESS NOTES
SUBJECTIVE:   CC: Gaviota Lopez is an 42 year old woman who presents for preventive health visit.       Healthy Habits:    Do you get at least three servings of calcium containing foods daily (dairy, green leafy vegetables, etc.)? yes    Amount of exercise or daily activities, outside of work: 5-6 day(s) per week    Problems taking medications regularly No    Medication side effects: No    Have you had an eye exam in the past two years? yes    Do you see a dentist twice per year? yes    Do you have sleep apnea, excessive snoring or daytime drowsiness?no      History of AR, anxiety, bulimia, raynaud, asthma and subclincal hypothyroidism.  No concerns today.  No issues with asthma.  Mood stable - is on buspar.       Today's PHQ-2 Score:   PHQ-2 ( 1999 Pfizer) 11/15/2022 6/29/2021   Q1: Little interest or pleasure in doing things 1 0   Q2: Feeling down, depressed or hopeless 1 0   PHQ-2 Score 2 0   PHQ-2 Total Score (12-17 Years)- Positive if 3 or more points; Administer PHQ-A if positive - 0   Some encounter information is confidential and restricted. Go to Review Flowsheets activity to see all data.       Abuse: Current or Past(Physical, Sexual or Emotional)- No  Do you feel safe in your environment? Yes        Social History     Tobacco Use     Smoking status: Never     Smokeless tobacco: Never   Substance Use Topics     Alcohol use: No     If you drink alcohol do you typically have >3 drinks per day or >7 drinks per week? No                     Reviewed orders with patient.  Reviewed health maintenance and updated orders accordingly - Yes  Lab work is in process  Labs reviewed in EPIC  BP Readings from Last 3 Encounters:   11/15/22 110/67   11/09/21 130/77   06/29/21 136/72    Wt Readings from Last 3 Encounters:   11/15/22 51.4 kg (113 lb 6.4 oz)   11/09/21 50.8 kg (112 lb)   06/29/21 52.5 kg (115 lb 12.8 oz)                  Patient Active Problem List   Diagnosis     Amenorrhea     Menses painful     Past  Surgical History:   Procedure Laterality Date     CLOSED RX CLAVICLE FRACTURE Right     Surgery       Social History     Tobacco Use     Smoking status: Never     Smokeless tobacco: Never   Substance Use Topics     Alcohol use: No     Family History   Problem Relation Age of Onset     Breast Cancer Mother         age 68     No Known Problems Father      No Known Problems Brother      Diabetes Maternal Grandfather      Hypertension Maternal Grandfather      C.A.D. Paternal Grandfather      Cancer - colorectal Paternal Grandfather         polyps     Diabetes Maternal Uncle      Hypertension Maternal Uncle          Current Outpatient Medications   Medication Sig Dispense Refill     albuterol (PROAIR RESPICLICK) 108 (90 Base) MCG/ACT inhaler Inhale 2 puffs into the lungs every 6 hours as needed for shortness of breath / dyspnea or wheezing 1 Inhaler 0     busPIRone (BUSPAR) 10 MG tablet Take 1 tablet by mouth 2 times a day. Take with one 15 mg tablet to make total of 25 mg 2 times a day 180 tablet 3     busPIRone (BUSPAR) 15 MG tablet Take 1 tablet by mouth 2 times a day. Take with one 10 mg tab to make total of 25 mg 2 times a day. 180 tablet 3     fluticasone (FLONASE) 50 MCG/ACT nasal spray Spray 1 spray into both nostrils daily       loratadine (CLARITIN) 10 MG tablet Take 10 mg by mouth daily       topiramate (TOPAMAX) 50 MG tablet Take 2 tabs HS and 25 mg daily as needed. 225 tablet 3     Allergies   Allergen Reactions     Cephalosporins Hives     Recent Labs   Lab Test 10/27/21  0800 10/09/20  1701 11/26/19  0846 10/10/19  0820   *  --   --  123*   HDL 63  --   --  68   TRIG 85  --   --  49   ALT  --   --   --  20   CR  --   --   --  0.86   GFRESTIMATED  --   --   --  85   GFRESTBLACK  --   --   --  >90   POTASSIUM  --   --   --  3.6   TSH 5.52* 2.48   < > 4.19*    < > = values in this interval not displayed.        FHS-7: No flowsheet data found.  click delete button to remove this line now  Mammogram  Screening - Offered annual screening and updated Health Maintenance for mutual plan based on risk factor consideration    Pertinent mammograms are reviewed under the imaging tab.    Pertinent mammograms are reviewed under the imaging tab.  History of abnormal Pap smear:   Last 3 Pap and HPV Results:   PAP / HPV Latest Ref Rng & Units 10/10/2019   PAP (Historical) - NIL   HPV16 NEG:Negative Negative   HPV18 NEG:Negative Negative   HRHPV NEG:Negative Negative     PAP / HPV Latest Ref Rng & Units 10/10/2019   PAP (Historical) - NIL   HPV16 NEG:Negative Negative   HPV18 NEG:Negative Negative   HRHPV NEG:Negative Negative     Reviewed and updated as needed this visit by clinical staff   Tobacco  Allergies  Meds   Med Hx  Surg Hx  Fam Hx  Soc Hx        Reviewed and updated as needed this visit by Provider        Surg Hx          Past Medical History:   Diagnosis Date     Allergic rhinitis, cause unspecified      Anxiety      Eating disorder     Bulimia     PMDD (premenstrual dysphoric disorder)      Raynaud disease      Subclinical hypothyroidism      Unspecified asthma(493.90)       Past Surgical History:   Procedure Laterality Date     CLOSED RX CLAVICLE FRACTURE Right     Surgery     OB History    Para Term  AB Living   0 0 0 0 0 0   SAB IAB Ectopic Multiple Live Births   0 0 0 0 0       ROS:  CONSTITUTIONAL: NEGATIVE for fever, chills, change in weight  INTEGUMENTARU/SKIN: NEGATIVE for worrisome rashes, moles or lesions  EYES: NEGATIVE for vision changes or irritation  ENT: NEGATIVE for ear, mouth and throat problems  RESP: NEGATIVE for significant cough or SOB  BREAST: NEGATIVE for masses, tenderness or discharge  CV: NEGATIVE for chest pain, palpitations or peripheral edema  GI: NEGATIVE for nausea, abdominal pain, heartburn, or change in bowel habits  : NEGATIVE for unusual urinary or vaginal symptoms. Periods are regular.  MUSCULOSKELETAL: NEGATIVE for significant arthralgias or  "myalgia  NEURO: NEGATIVE for weakness, dizziness or paresthesias  ENDOCRINE: NEGATIVE for temperature intolerance, skin/hair changes  HEME/ALLERGY/IMMUNE: NEGATIVE for bleeding problems  PSYCHIATRIC: NEGATIVE for changes in mood or affect    OBJECTIVE:   /67 (BP Location: Right arm, Patient Position: Sitting, Cuff Size: Adult Regular)   Pulse 55   Temp 97.7  F (36.5  C) (Oral)   Resp 15   Ht 1.626 m (5' 4\")   Wt 51.4 kg (113 lb 6.4 oz)   SpO2 100%   BMI 19.47 kg/m    EXAM:  GENERAL: healthy, alert and no distress  EYES: Eyes grossly normal to inspection, PERRL and conjunctivae and sclerae normal  HENT: ear canals and TM's normal, nose and mouth without ulcers or lesions  NECK: no adenopathy, no asymmetry, masses, or scars and thyroid normal to palpation  RESP: lungs clear to auscultation - no rales, rhonchi or wheezes  BREAST: normal without masses, tenderness or nipple discharge and no palpable axillary masses or adenopathy  CV: regular rate and rhythm, normal S1 S2, no S3 or S4, no murmur, click or rub, no peripheral edema and peripheral pulses strong  ABDOMEN: soft, nontender, no hepatosplenomegaly, no masses and bowel sounds normal   (female): deferred  MS: no gross musculoskeletal defects noted, no edema  SKIN: no suspicious lesions or rashes  NEURO: Normal strength and tone, mentation intact and speech normal  PSYCH: mentation appears normal, affect normal/bright  LYMPH: no cervical, supraclavicular, axillary, or inguinal adenopathy    Diagnostic Test Results:  Labs reviewed in Epic  Fasting lab work pending     ASSESSMENT/PLAN:   Gaviota was seen today for physical.    Diagnoses and all orders for this visit:    Routine history and physical examination of adult  -     Hemoglobin; Future  -     Lipid panel reflex to direct LDL Fasting; Future  -     Glucose; Future    Subclinical hypothyroidism  -     TSH; Future  -     T4 free; Future  -     Thyroid peroxidase antibody; Future  -     Anti " "thyroglobulin antibody; Future    Screening for hyperlipidemia  -     Lipid panel reflex to direct LDL Fasting; Future    Screening for diabetes mellitus  -     Glucose; Future      Mammogram completed through [facility].   Due for a pap smear with HPV 10/2024.      COUNSELING:   Reviewed preventive health counseling, as reflected in patient instructions    Estimated body mass index is 19.47 kg/m  as calculated from the following:    Height as of this encounter: 1.626 m (5' 4\").    Weight as of this encounter: 51.4 kg (113 lb 6.4 oz).        She reports that she has never smoked. She has never used smokeless tobacco.      Counseling Resources:  ATP IV Guidelines  Pooled Cohorts Equation Calculator  Breast Cancer Risk Calculator  BRCA-Related Cancer Risk Assessment: FHS-7 Tool  FRAX Risk Assessment  ICSI Preventive Guidelines  Dietary Guidelines for Americans, 2010  USDA's MyPlate  ASA Prophylaxis  Lung CA Screening    I was present with the NPP student, Ness Persaud, who participated in the service and in the documentation of the services provided. I have verified the history and personally performed the physical exam and medical decision making, as documented by the student and edited by me    BRET Sharma CNP  11/22/22  9:27 AM      BRET Sharma, CNP  [facility] [place]  "

## 2023-02-02 ASSESSMENT — ANXIETY QUESTIONNAIRES
1. FEELING NERVOUS, ANXIOUS, OR ON EDGE: MORE THAN HALF THE DAYS
GAD7 TOTAL SCORE: 13
8. IF YOU CHECKED OFF ANY PROBLEMS, HOW DIFFICULT HAVE THESE MADE IT FOR YOU TO DO YOUR WORK, TAKE CARE OF THINGS AT HOME, OR GET ALONG WITH OTHER PEOPLE?: SOMEWHAT DIFFICULT
IF YOU CHECKED OFF ANY PROBLEMS ON THIS QUESTIONNAIRE, HOW DIFFICULT HAVE THESE PROBLEMS MADE IT FOR YOU TO DO YOUR WORK, TAKE CARE OF THINGS AT HOME, OR GET ALONG WITH OTHER PEOPLE: SOMEWHAT DIFFICULT
5. BEING SO RESTLESS THAT IT IS HARD TO SIT STILL: SEVERAL DAYS
4. TROUBLE RELAXING: MORE THAN HALF THE DAYS
GAD7 TOTAL SCORE: 13
3. WORRYING TOO MUCH ABOUT DIFFERENT THINGS: MORE THAN HALF THE DAYS
2. NOT BEING ABLE TO STOP OR CONTROL WORRYING: MORE THAN HALF THE DAYS
7. FEELING AFRAID AS IF SOMETHING AWFUL MIGHT HAPPEN: MORE THAN HALF THE DAYS
GAD7 TOTAL SCORE: 13
7. FEELING AFRAID AS IF SOMETHING AWFUL MIGHT HAPPEN: MORE THAN HALF THE DAYS
6. BECOMING EASILY ANNOYED OR IRRITABLE: MORE THAN HALF THE DAYS

## 2023-02-06 ASSESSMENT — PATIENT HEALTH QUESTIONNAIRE - PHQ9
SUM OF ALL RESPONSES TO PHQ QUESTIONS 1-9: 15
10. IF YOU CHECKED OFF ANY PROBLEMS, HOW DIFFICULT HAVE THESE PROBLEMS MADE IT FOR YOU TO DO YOUR WORK, TAKE CARE OF THINGS AT HOME, OR GET ALONG WITH OTHER PEOPLE: SOMEWHAT DIFFICULT
SUM OF ALL RESPONSES TO PHQ QUESTIONS 1-9: 15

## 2023-02-07 ENCOUNTER — VIRTUAL VISIT (OUTPATIENT)
Dept: PSYCHIATRY | Facility: CLINIC | Age: 43
End: 2023-02-07
Attending: NURSE PRACTITIONER
Payer: COMMERCIAL

## 2023-02-07 DIAGNOSIS — F50.9 EATING DISORDER, UNSPECIFIED TYPE: ICD-10-CM

## 2023-02-07 DIAGNOSIS — F32.81 PMDD (PREMENSTRUAL DYSPHORIC DISORDER): Primary | ICD-10-CM

## 2023-02-07 PROCEDURE — 99214 OFFICE O/P EST MOD 30 MIN: CPT | Mod: 95 | Performed by: NURSE PRACTITIONER

## 2023-02-07 RX ORDER — BUSPIRONE HYDROCHLORIDE 10 MG/1
TABLET ORAL
Qty: 180 TABLET | Refills: 3 | Status: SHIPPED | OUTPATIENT
Start: 2023-02-07 | End: 2023-11-20

## 2023-02-07 RX ORDER — TOPIRAMATE 50 MG/1
TABLET, FILM COATED ORAL
Qty: 225 TABLET | Refills: 3 | Status: SHIPPED | OUTPATIENT
Start: 2023-02-07 | End: 2023-05-02

## 2023-02-07 RX ORDER — BUSPIRONE HYDROCHLORIDE 15 MG/1
TABLET ORAL
Qty: 180 TABLET | Refills: 3 | Status: SHIPPED | OUTPATIENT
Start: 2023-02-07 | End: 2023-11-20

## 2023-02-07 NOTE — PATIENT INSTRUCTIONS
-Try taking Topamax 125 mg at bedtime consistently to see if this would cause oversedation or stabilize mood.  -Continue current Buspar regimen.    Your next appointment is scheduled on 3/7/2023 (Tue) at 3:30pm.      **For crisis resources, please see the information at the end of this document**   Patient Education    Thank you for coming to the [facility] [place] [facility].     Lab Testing:  If you had lab testing today and your results are reassuring or normal they will be mailed to you or sent through [employer] within 7 days. If the lab tests need quick action we will call you with the results. The phone number we will call with results is # [phone]. If this is not the best number please call our clinic and change the number.     Medication Refills:  If you need any refills please call your pharmacy and they will contact us. Our fax number for refills is [phone].   Three business days of notice are needed for general medication refill requests.   Five business days of notice are needed for controlled substance refill requests.   If you need to change to a different pharmacy, please contact the new pharmacy directly. The new pharmacy will help you get your medications transferred.     Contact Us:  Please call [phone] during business hours (8-5:00 M-F).   If you have medication related questions after clinic hours, or on the weekend, please call [phone].     Financial Assistance [phone]   Medical Records [phone]       MENTAL HEALTH CRISIS RESOURCES:  For a emergency help, please call 911 or go to the nearest Emergency Department.     Emergency Walk-In Options:   EmPATH Unit @ [facility] ([place]): [phone] - Specialized mental health emergency area designed to be calming  [facility] ([place]): [phone]  [facility] ([place]): [phone]  [facility] ([place]):  [phone]    [facility] Crisis Information:   [place]: [phone]  [place]: [phone]  [place] - Adult: [phone]     Child: [phone]  [place] - Adult: [phone]     Child: [phone]  [place]: [phone]  List of all [facility] resources:   https://[place].gov/dhs/people-we-serve/adults/health-care/mental-health/resources/crisis-contacts.jsp    National Crisis Information:   Crisis Text Line: Text  [place]  to 259119  Suicide & Crisis Lifeline: 988  National Suicide Prevention Lifeline: 0-331-476-TALK (1-594.104.5554)       For online chat options, visit https://suicidepreventionlifeline.org/chat/  Poison Control Center: 1-291.838.7783  Trans Lifeline: 1-622.685.4945 - Hotline for transgender people of all ages  The [employer] Project: 4-565-503-0963 - Hotline for LGBT youth     For Non-Emergency Support:   Fast Tracker: Mental Health & Substance Use Disorder Resources -   https://www.[employer]rack[employer]notesn.org/

## 2023-02-07 NOTE — PROGRESS NOTES
"Gaviota Lopez is a 42 year old who is being evaluated via a billable video visit.      Pt will join video visit via: Danae  If there are problems joining the visit, send backup video invite via: Text to preferred phone: [phone]    Reason for telehealth visit: Patient has requested telehealth visit    Originating location (patient location): Patient's home    Will anyone else be joining the visit? No        VIDEO VISIT  Gaviota Lopez is a 42 year old patient who is being evaluated via a billable video visit.      The patient has been notified of following:   \"This video visit will be conducted via a call between you and your physician/provider. We have found that certain health care needs can be provided without the need for an in-person physical exam. This service lets us provide the care you need with a video conversation. If a prescription is necessary we can send it directly to your pharmacy. If lab work is needed we can place an order for that and you can then stop by our lab to have the test done at a later time. Insurers are generally covering virtual visits as they would in-office visits so billing should not be different than normal.  If for some reason you do get billed incorrectly, you should contact the billing office to correct it and that number is in the AVS .    Video Conference to be completed via:  Danae    Patient has given verbal consent for video visit?:  Yes    Patient would prefer that any video invitations be sent by: Send to e-mail at: [email or web address]      How would patient like to obtain AVS?:  BlogBus    AVS SmartPhrase [PsychAVS] has been placed in 'Patient Instructions':  Yes     Start Time:  1500       End Time: 1520    Telemedicine Visit: The patient's condition can be safely assessed and treated via synchronous audio and visual telemedicine encounter.      Reason for Telemedicine Visit: Due to COVID 19 pandemic, clinic switching all appointments to " telemedicine     Originating Site (Patient Location): Patient's home    Distant Site (Provider Location): Provider Remote Setting    Consent:  The patient/guardian has verbally consented to: the potential risks and benefits of telemedicine (video visit) versus in person care; bill my insurance or make self-payment for services provided; and responsibility for payment of non-covered services.     Mode of Communication:  Video Conference via AmWell    As the provider I attest to compliance with applicable laws and regulations related to telemedicine.    Psychiatry Clinic Progress Note                                                                  Patient Name: Gaviota Lopez  YOB: 1980  MRN: [id number]  Date of Service: 02/07/2023  Last Seen: 2/1/2022    Gaviota Lopez is a 42 year old person assigned female at birth, identifies as cisgender female who uses the name Gaviota and pronoun tawanda.      Gaviota Lopez is a 42 year old year old adult who presents for ongoing psychiatric care.  Gaviota Lopez was last seen on  2/1/2022.    At that time,     Medication Ordered/Consults/Labs/tests Ordered:     Medication: Continue on current medication regimen.  OTC Recommendations: none  Lab Orders:  none  Referrals: none  Release of Information: none  Future Treatment Considerations: Per symptoms.   Return for Follow Up: in 1 year    Pertinent Background:  Diagnoses include PMDD, anxiety, depression.  Psych critical item history includes mutiple psychotropic trials and eating disorder (Bullimia).      Previous Psychiatric Meds: Ativan (helpful only when menstrual cycle can be predictable, also built tolerance over time), Beatris (mood exacerbation), Zoloft (not effective), Prozac (not effective), Trazodone (oversedation at 100 mg, not effective at 50mg), Pristiq (not effective), elavil (helped sleep only), Vyvance (helpful for anxiety at 20 mg in AM, but sleep cycle worsening), Buspar  (increased anxiety at 30 mg BID)    Interim History                                                                                                        4, 4     Since the last visit,  -Continues to do fairly well. -Continues to do well.  Anxiety and mood are mostly well managed, denies SI, SIB or HI.  -But continued fluctuation on anxiety and lower mood during luteal phase. It's taking more energy to manage this and considering different treatment options.  -Takes PRN Topamax 25 mg in AM few times during luteal phase, but it's causing oversedation so not taking it consistently.  -Sleeping 7 hours/night and open to sleeping some more if able.  100 mg HS does not cause oversedation in AM.  -Just had luteal phase ended few days ago.  -Prefers to continue virtual appt as long as able.    Denies any symptoms suggestive of hypomania or psychosis.    Current Suicidality/Hx of Suicide Attempts: Denies both  CoCominent Medical concerns: none    Medication Side Effects: The patient denies all medication side effects.      Medical Review of Systems     Apart from the symptoms mentioned int he HPI, the 14 point review of systems, including constitutional, HEENT, cardiovascular, respiratory, gastrointestinal, genitourinary, musculoskeletal, integumentary, endocrine, neurological, hematologic and allergic is entirely negative.    Pregnant: None. Nursing: None, Contraception: not currently sexually active      Substance Use   Pt has been staying substance free since last seen.      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    Allergy                                Cephalosporins    Current Medications                                                                                          "              Current Outpatient Medications   Medication Sig Dispense Refill     albuterol (PROAIR RESPICLICK) 108 (90 Base) MCG/ACT inhaler Inhale 2 puffs into the lungs every 6 hours as needed for shortness of breath / dyspnea or wheezing 1 Inhaler 0     busPIRone (BUSPAR) 10 MG tablet Take 1 tablet by mouth 2 times a day. Take with one 15 mg tablet to make total of 25 mg 2 times a day 180 tablet 3     busPIRone (BUSPAR) 15 MG tablet Take 1 tablet by mouth 2 times a day. Take with one 10 mg tab to make total of 25 mg 2 times a day. 180 tablet 3     fluticasone (FLONASE) 50 MCG/ACT nasal spray Spray 1 spray into both nostrils daily       loratadine (CLARITIN) 10 MG tablet Take 10 mg by mouth daily       topiramate (TOPAMAX) 50 MG tablet Take 2 tabs HS and 25 mg daily as needed. 225 tablet 3          Mental Status Exam                                                                                   9, 14 cog        Alertness: alert  and oriented  Appearance:  Casually dressed and Well groomed  Behavior/Demeanor: cooperative, pleasant and calm, with good  eye contact   Speech: regular rate and rhythm  Mood :  \"OK\"  Affect: appropriate and slightly subdued; was congruent to mood; was congruent to content  Thought Process (Associations):  Logical, Linear and Goal directed  Thought process (Rate):  Normal  Thought content:  no overt psychosis, denies suicidal ideation, intent or thoughts and patient does not appear to be responding to internal stimuli  Perception:  Reports none;  Denies depersonalization and derealization  Attention/Concentration:  Normal  Memory:  Immediate recall intact and Short-term memory intact  Language: intact  Fund of Knowledge/Intelligence:  Average  Abstraction:  Normal  Insight:  Good  Judgment:  Good  Cognition: (6) does  appear grossly intact; formal cognitive testing was not done    Physical Exam     Motor activity/EPS:  Normal  Psychomotor: normal or unremarkable    Labs and Results    "   Pertinent findings on review include: Review of records with relevant information reported in the HPI.  Reviewed pt's past medical record and obtained collateral information.      [place] PRESCRIPTION MONITORING PROGRAM [] was checked today:  not using controlled substances.      PHQ 6/29/2021 11/15/2022 2/6/2023   PHQ-9 Total Score 8 8 15   Q9: Thoughts of better off dead/self-harm past 2 weeks Not at all Not at all Not at all       JERONIMO-7 SCORE 11/8/2021 11/15/2022 2/2/2023   Total Score 5 (mild anxiety) - 13 (moderate anxiety)   Total Score 5 9 13       Recent Labs   Lab Test 10/10/19  0820   CR 0.86   GFRESTIMATED 85     Recent Labs   Lab Test 10/10/19  0820   AST 20   ALT 20   ALKPHOS 58     TSH: 4.47 (11/15/2022), 5.52 (10/27/2021)    PSYCHOTROPIC DRUG INTERACTIONS:    no.  MANAGEMENT:  N/A    Impression/Assessment      Gaviota Lopez is a 42 year old adult  who presents for med management follow up.  Pt appears slightly subdued, but not anxious, denies SI, SIB or HI.  Today is almost immediately after luteal phase.  Pt continues to have mood and anxiety fluctuation on her luteal cycle, noting this is taking more energy to manage.  Wanted to revisit different PMDD treatment. Discussed in depth about possible BECKY use, selective serotonin reuptake inhibitor, SNRI trial. Also discussed possibility of taking PRN Topamax consistently at HS since PRN use in AM is causing oversedation in AM.  Pt decided to try Topamax 125 mg HS while continuing current Buspar regimen for now.    Diagnosis                                                                    PMDD  Unspecified eating d/o.  Hx dx of JERONIMO    Treatment Recommendation & Plan       Medication Ordered/Consults/Labs/tests Ordered:     Medication:  -Try taking Topamax 125 mg at bedtime consistently to see if this would cause oversedation or stabilize mood.  -Continue current Buspar regimen.  OTC Recommendations: none  Lab Orders:  none  Referrals:  none  Release of Information: none  Future Treatment Considerations: Per symptoms.   Return for Follow Up: in 1 month    -Discussed safety plan for suicidal thoughts  -Discussed plan for suicidality  -Discussed available emergency services  -Patient agrees with the treatment plan  -Encouraged to continue outpatient therapy to gain more coping mechanism for stress.      Treatment Risk Statement: Discussed with the patient my impressions, as well as recommended studies. I educated patient on the differential diagnosis and prognosis. I discussed with the patient the risks and benefits of medications versus no interventions, including efficacy, dose, possible side effects and length of treatment and the importance of medication compliance.  The patient understands the risks, benefits, adverse effects and alternatives. Agrees to treatment with the capacity to do so. No medical contraindications to treatment. The patient also understands the risks of using street drugs or alcohol.     CRISIS NUMBERS:   Provided routinely in AVS.    Pari Duffy CNP,  02/07/2023

## 2023-02-07 NOTE — NURSING NOTE
Is the patient currently in the state of [place]? YES    Visit mode:VIDEO    If the visit is dropped, the patient can be reconnected by: VIDEO VISIT: Text to cell phone: [phone]    Will anyone else be joining the visit? NO      How would you like to obtain your AVS? MyChart    Are changes needed to the allergy or medication list? NO    Comments or concerns related to today's visit: No

## 2023-03-07 ENCOUNTER — VIRTUAL VISIT (OUTPATIENT)
Dept: PSYCHIATRY | Facility: CLINIC | Age: 43
End: 2023-03-07
Attending: NURSE PRACTITIONER
Payer: COMMERCIAL

## 2023-03-07 DIAGNOSIS — F32.81 PMDD (PREMENSTRUAL DYSPHORIC DISORDER): Primary | ICD-10-CM

## 2023-03-07 PROCEDURE — 99213 OFFICE O/P EST LOW 20 MIN: CPT | Mod: VID | Performed by: NURSE PRACTITIONER

## 2023-03-07 NOTE — PATIENT INSTRUCTIONS
-Continue on current medication regimen.    Your next appointment is scheduled on 5/2/2023 (Tue) at 8am.      **For crisis resources, please see the information at the end of this document**   Patient Education    Thank you for coming to the [facility] [place] [facility].     Lab Testing:  If you had lab testing today and your results are reassuring or normal they will be mailed to you or sent through Pict[employer]k within 7 days. If the lab tests need quick action we will call you with the results. The phone number we will call with results is # [phone]. If this is not the best number please call our clinic and change the number.     Medication Refills:  If you need any refills please call your pharmacy and they will contact us. Our fax number for refills is [phone].   Three business days of notice are needed for general medication refill requests.   Five business days of notice are needed for controlled substance refill requests.   If you need to change to a different pharmacy, please contact the new pharmacy directly. The new pharmacy will help you get your medications transferred.     Contact Us:  Please call [phone] during business hours (8-5:00 M-F).   If you have medication related questions after clinic hours, or on the weekend, please call [phone].     Financial Assistance [phone]   Medical Records [phone]       MENTAL HEALTH CRISIS RESOURCES:  For a emergency help, please call 911 or go to the nearest Emergency Department.     Emergency Walk-In Options:   Em[facility] Unit @ [facility] ([place]): [phone] - Specialized mental health emergency area designed to be calming  [facility] ([place]): [phone]  [facility] ([place]): [phone]  [facility]): [phone]    [facility] Crisis Information:   [place]: [phone]  [place]: [phone]  [place] - Adult:  [phone]     Child: [phone]  [place] - Adult: [phone]     Child: [phone]  [place]: [phone]  List of all [facility] resources:   https://[place].gov/dhs/people-we-serve/adults/health-care/mental-health/resources/crisis-contacts.jsp    National Crisis Information:   Crisis Text Line: Text  [place]  to 704948  Suicide & Crisis Lifeline: 988  National Suicide Prevention Lifeline: 0-729-390-TALK (1-887.763.8545)       For online chat options, visit https://suicidepreventionlifeline.org/chat/  Poison Control Center: 1-643.959.8172  Trans Lifeline: 1-704.859.6667 - Hotline for transgender people of all ages  The [employer]: 8-364-915-7558 - Hotline for LGBT youth     For Non-Emergency Support:   Fast Tracker: Mental Health & Substance Use Disorder Resources -   https://www.[employer].org/

## 2023-03-07 NOTE — PROGRESS NOTES
Gaviota Lopez is a 42 year old who is being evaluated via a billable video visit.      Pt will join video visit via: [employer]  If there are problems joining the visit, send backup video invite via: Send to preferred e-mail: [email or web address]    Originating location (patient location): Patient's home    Will anyone else be joining the visit? No     PHQ not complete per department protocol.    Trena Matta, DIDIER on 3/7/2023 at 3:17 PM

## 2023-03-07 NOTE — PROGRESS NOTES
"Gaviota Lopez is a 42 year old who is being evaluated via a billable video visit.      Pt will join video visit via: Danae  If there are problems joining the visit, send backup video invite via: Text to preferred phone: [phone]    Reason for telehealth visit: Patient has requested telehealth visit    Originating location (patient location): Patient's home    Will anyone else be joining the visit? No        VIDEO VISIT  Gaviota Lopez is a 42 year old patient who is being evaluated via a billable video visit.      The patient has been notified of following:   \"This video visit will be conducted via a call between you and your physician/provider. We have found that certain health care needs can be provided without the need for an in-person physical exam. This service lets us provide the care you need with a video conversation. If a prescription is necessary we can send it directly to your pharmacy. If lab work is needed we can place an order for that and you can then stop by our lab to have the test done at a later time. Insurers are generally covering virtual visits as they would in-office visits so billing should not be different than normal.  If for some reason you do get billed incorrectly, you should contact the billing office to correct it and that number is in the AVS .    Video Conference to be completed via:  Danae    Patient has given verbal consent for video visit?:  Yes    Patient would prefer that any video invitations be sent by: Send to e-mail at: [email or web address]      How would patient like to obtain AVS?:  [employer] - Social Platform    AVS SmartPhrase [PsychAVS] has been placed in 'Patient Instructions':  Yes     Start Time:  1530       End Time: 1539    Telemedicine Visit: The patient's condition can be safely assessed and treated via synchronous audio and visual telemedicine encounter.      Reason for Telemedicine Visit: Due to COVID 19 pandemic, clinic switching all appointments to " telemedicine     Originating Site (Patient Location): Patient's home    Distant Site (Provider Location): Provider Remote Setting    Consent:  The patient/guardian has verbally consented to: the potential risks and benefits of telemedicine (video visit) versus in person care; bill my insurance or make self-payment for services provided; and responsibility for payment of non-covered services.     Mode of Communication:  Video Conference via AmWell    As the provider I attest to compliance with applicable laws and regulations related to telemedicine.    Psychiatry Clinic Progress Note                                                                  Patient Name: Gaviota Lopez  YOB: 1980  MRN: [id number]  Date of Service: 03/07/2023  Last Seen: 2/7/2023    Gaviota Lopez is a 42 year old person assigned female at birth, identifies as cisgender female who uses the name Gaviota and pronoun tawanda.      Gaviota Lopez is a 42 year old year old adult who presents for ongoing psychiatric care.  Gaviota Lopez was last seen on  2/7/2023.    At that time,     Medication Ordered/Consults/Labs/tests Ordered:     Medication:  -Try taking Topamax 125 mg at bedtime consistently to see if this would cause oversedation or stabilize mood.  -Continue current Buspar regimen.  OTC Recommendations: none  Lab Orders:  none  Referrals: none  Release of Information: none  Future Treatment Considerations: Per symptoms.   Return for Follow Up: in 1 month    Pertinent Background:  Diagnoses include PMDD, anxiety, depression.  Psych critical item history includes mutiple psychotropic trials and eating disorder (Bullimia).      Previous Psychiatric Meds: Ativan (helpful only when menstrual cycle can be predictable, also built tolerance over time), Beatris (mood exacerbation), Zoloft (not effective), Prozac (not effective), Trazodone (oversedation at 100 mg, not effective at 50mg), Pristiq (not effective),  elavil (helped sleep only), Vyvance (helpful for anxiety at 20 mg in AM, but sleep cycle worsening), Buspar (increased anxiety at 30 mg BID)    Interim History                                                                                                        4, 4     Since the last visit,  -Has been doing well.  Taking Topamax 125 mg HS consistently and noted initially oversedating and possible muscle cramp, but both have resolved.  -Mood has been significantly better with increased Topamax. Still experiences dysphoria around 3-4 days prior to menses, but this is significantly shorter in duration from 3-10 days and very happy about this.  -Wants to continue on current medication regimen little longer to see if this continues to improve mood.    Denies any symptoms suggestive of hypomania or psychosis.    Current Suicidality/Hx of Suicide Attempts: Denies both  CoCominent Medical concerns: none    Medication Side Effects: The patient denies all medication side effects.      Medical Review of Systems     Apart from the symptoms mentioned int he HPI, the 14 point review of systems, including constitutional, HEENT, cardiovascular, respiratory, gastrointestinal, genitourinary, musculoskeletal, integumentary, endocrine, neurological, hematologic and allergic is entirely negative.    Pregnant: None. Nursing: None, Contraception: not currently sexually active      Substance Use   Pt has been staying substance free since last seen.      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    Allergy                                Cephalosporins    Current Medications                                                                                                       Current Outpatient  "Medications   Medication Sig Dispense Refill     albuterol (PROAIR RESPICLICK) 108 (90 Base) MCG/ACT inhaler Inhale 2 puffs into the lungs every 6 hours as needed for shortness of breath / dyspnea or wheezing 1 Inhaler 0     busPIRone (BUSPAR) 10 MG tablet Take 1 tablet by mouth 2 times a day. Take with one 15 mg tablet to make total of 25 mg 2 times a day 180 tablet 3     busPIRone (BUSPAR) 15 MG tablet Take 1 tablet by mouth 2 times a day. Take with one 10 mg tab to make total of 25 mg 2 times a day. 180 tablet 3     fluticasone (FLONASE) 50 MCG/ACT nasal spray Spray 1 spray into both nostrils daily       loratadine (CLARITIN) 10 MG tablet Take 10 mg by mouth daily       topiramate (TOPAMAX) 50 MG tablet Take 2.5 tabs (125 mg) at bedtime. 225 tablet 3          Mental Status Exam                                                                                   9, 14 cog        Alertness: alert  and oriented  Appearance:  Casually dressed and Well groomed  Behavior/Demeanor: cooperative, pleasant and calm, with good  eye contact   Speech: regular rate and rhythm  Mood :  \"much better\"  Affect: mostly euthymic; was congruent to mood; was congruent to content  Thought Process (Associations):  Logical, Linear and Goal directed  Thought process (Rate):  Normal  Thought content:  no overt psychosis, denies suicidal ideation, intent or thoughts and patient does not appear to be responding to internal stimuli  Perception:  Reports none;  Denies depersonalization and derealization  Attention/Concentration:  Normal  Memory:  Immediate recall intact and Short-term memory intact  Language: intact  Fund of Knowledge/Intelligence:  Average  Abstraction:  Normal  Insight:  Good  Judgment:  Good  Cognition: (6) does  appear grossly intact; formal cognitive testing was not done    Physical Exam     Motor activity/EPS:  Normal  Psychomotor: normal or unremarkable    Labs and Results      Pertinent findings on review include: Review " of records with relevant information reported in the HPI.  Reviewed pt's past medical record and obtained collateral information.      [place] MON[place] [] was checked today:  not using controlled substances.      PHQ 6/29/2021 11/15/2022 2/6/2023   PHQ-9 Total Score 8 8 15   Q9: Thoughts of better off dead/self-harm past 2 weeks Not at all Not at all Not at all       JERONIMO-7 SCORE 11/8/2021 11/15/2022 2/2/2023   Total Score 5 (mild anxiety) - 13 (moderate anxiety)   Total Score 5 9 13       Recent Labs   Lab Test 10/10/19  0820   CR 0.86   GFRESTIMATED 85     Recent Labs   Lab Test 10/10/19  0820   AST 20   ALT 20   ALKPHOS 58     TSH: 4.47 (11/15/2022), 5.52 (10/27/2021)    PSYCHOTROPIC DRUG INTERACTIONS:    no.  MANAGEMENT:  N/A    Impression/Assessment      Gaviota Lopez is a 42 year old adult  who presents for med management follow up.  Pt appears mostly stable in her mood and anxiety, denies SI, SIB or HI.  Pt noted initial oversedation and possible muscle cramp when consistently taking Topamax 125 mg HS, but these sxs resolved. Pt is noting significant shorter duration of dysphoria during PMDD. Pt wants to continue on current medication regimen to see if this would continue to improve. OK to continue on current medication regimen.    Diagnosis                                                                    PMDD  Unspecified eating d/o.  Hx dx of JERONIMO    Treatment Recommendation & Plan       Medication Ordered/Consults/Labs/tests Ordered:     Medication: Continue on current medication regimen.  OTC Recommendations: none  Lab Orders:  none  Referrals: none  Release of Information: none  Future Treatment Considerations: Per symptoms.   Return for Follow Up: in 2 months per pt's request    -Discussed safety plan for suicidal thoughts  -Discussed plan for suicidality  -Discussed available emergency services  -Patient agrees with the treatment plan  -Encouraged to continue outpatient  therapy to gain more coping mechanism for stress.      Treatment Risk Statement: Discussed with the patient my impressions, as well as recommended studies. I educated patient on the differential diagnosis and prognosis. I discussed with the patient the risks and benefits of medications versus no interventions, including efficacy, dose, possible side effects and length of treatment and the importance of medication compliance.  The patient understands the risks, benefits, adverse effects and alternatives. Agrees to treatment with the capacity to do so. No medical contraindications to treatment. The patient also understands the risks of using street drugs or alcohol.     CRISIS NUMBERS:   Provided routinely in AVS.    Pari Duffy CNP,  03/07/2023

## 2023-03-07 NOTE — NURSING NOTE
Is the patient currently in the state of [place]? YES    Visit mode:VIDEO    If the visit is dropped, the patient can be reconnected by: VIDEO VISIT: Send to e-mail at: [email or web address]    Will anyone else be joining the visit? NO      How would you like to obtain your AVS? MyChart    Are changes needed to the allergy or medication list? NO    Reason for visit: Psychiatry Return    PHQ not complete per department protocol.    DIDIER Green on 3/7/2023 at 3:18 PM

## 2023-05-02 ENCOUNTER — VIRTUAL VISIT (OUTPATIENT)
Dept: PSYCHIATRY | Facility: CLINIC | Age: 43
End: 2023-05-02
Attending: NURSE PRACTITIONER
Payer: COMMERCIAL

## 2023-05-02 DIAGNOSIS — F32.81 PMDD (PREMENSTRUAL DYSPHORIC DISORDER): Primary | ICD-10-CM

## 2023-05-02 DIAGNOSIS — F50.9 EATING DISORDER, UNSPECIFIED TYPE: ICD-10-CM

## 2023-05-02 PROCEDURE — G0463 HOSPITAL OUTPT CLINIC VISIT: HCPCS | Mod: PN,GT | Performed by: NURSE PRACTITIONER

## 2023-05-02 PROCEDURE — 99214 OFFICE O/P EST MOD 30 MIN: CPT | Mod: VID | Performed by: NURSE PRACTITIONER

## 2023-05-02 RX ORDER — TOPIRAMATE 50 MG/1
50 TABLET, FILM COATED ORAL DAILY
Qty: 30 TABLET | Refills: 1 | Status: SHIPPED | OUTPATIENT
Start: 2023-05-02 | End: 2023-05-30

## 2023-05-02 NOTE — NURSING NOTE
Is the patient currently in the state of [place]? YES    Visit mode:VIDEO    If the visit is dropped, the patient can be reconnected by: VIDEO VISIT: Send to e-mail at: [email or web address]    Will anyone else be joining the visit? NO      How would you like to obtain your AVS? MyChart    Are changes needed to the allergy or medication list? NO    Reason for visit: Video Visit

## 2023-05-02 NOTE — PROGRESS NOTES
Virtual Visit Details    Type of service:  Video Visit     Originating Location (pt. Location): Home  Distant Location (provider location):  On-site  Platform used for Video Visit: [facility]      Psychiatry Clinic Progress Note                                                                  Patient Name: Gaviota Lopez  YOB: 1980  MRN: [id number]  Date of Service: 05/02/2023  Last Seen:3/7/2023    Gaviota Lopez is a 42 year old person assigned female at birth, identifies as nonbinary/gender queer who uses the name Gaviota and pronoun she.      Gaviota Lopez is a 42 year old year old adult who presents for ongoing psychiatric care.  Gaviota Lopez was last seen on  3/7/2023.    At that time,     Medication Ordered/Consults/Labs/tests Ordered:     Medication: Continue on current medication regimen.  OTC Recommendations: none  Lab Orders:  none  Referrals: none  Release of Information: none  Future Treatment Considerations: Per symptoms.   Return for Follow Up: in 2 months per pt's request    Pertinent Background:  Diagnoses include PMDD, anxiety, depression.  Psych critical item history includes mutiple psychotropic trials and eating disorder (Bullimia).      Previous Psychiatric Meds: Ativan (helpful only when menstrual cycle can be predictable, also built tolerance over time), Beatris (mood exacerbation), Zoloft (not effective), Prozac (not effective), Trazodone (oversedation at 100 mg, not effective at 50mg), Pristiq (not effective), elavil (helped sleep only), Vyvance (helpful for anxiety at 20 mg in AM, but sleep cycle worsening), Buspar (increased anxiety at 30 mg BID)    Interim History                                                                                                        4, 4     Since the last visit,  -Continued Topamax 125 mg only for couple weeks after last appointment due to oversedation.  -Reduced Topamax to 100 mg HS, feels oversedation is somewhat  continuing.  Because of oversedation, has not been exercising, thus depression is worsening in general. Also, mood worsening during luteal phase.  -Wants to further decrease Topamax to see if this would help oversedation.    Denies any symptoms suggestive of hypomania or psychosis.    Current Suicidality/Hx of Suicide Attempts: Denies both  CoCominent Medical concerns: none    Medication Side Effects: The patient denies all medication side effects.      Medical Review of Systems     Apart from the symptoms mentioned int he HPI, the 14 point review of systems, including constitutional, HEENT, cardiovascular, respiratory, gastrointestinal, genitourinary, musculoskeletal, integumentary, endocrine, neurological, hematologic and allergic is entirely negative.    Pregnant: None. Nursing: None, Contraception: not currently sexually active      Substance Use   Pt has been staying substance free since last seen.      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    Allergy                                Cephalosporins    Current Medications                                                                                                       Current Outpatient Medications   Medication Sig Dispense Refill     albuterol (PROAIR RESPICLICK) 108 (90 Base) MCG/ACT inhaler Inhale 2 puffs into the lungs every 6 hours as needed for shortness of breath / dyspnea or wheezing 1 Inhaler 0     busPIRone (BUSPAR) 10 MG tablet Take 1 tablet by mouth 2 times a day. Take with one 15 mg tablet to make total of 25 mg 2 times a day 180 tablet 3     busPIRone (BUSPAR) 15 MG tablet Take 1 tablet by mouth 2 times a day. Take with one 10 mg tab to make total of 25 mg 2 times a day. 180 tablet 3     fluticasone  "(FLONASE) 50 MCG/ACT nasal spray Spray 1 spray into both nostrils daily       loratadine (CLARITIN) 10 MG tablet Take 10 mg by mouth daily       topiramate (TOPAMAX) 50 MG tablet Take 2.5 tabs (125 mg) at bedtime. 225 tablet 3          Mental Status Exam                                                                                   9, 14 cog        Alertness: alert  and oriented  Appearance:  Casually dressed and Well groomed  Behavior/Demeanor: cooperative, pleasant and calm, with good  eye contact   Speech: regular rate and rhythm  Mood :  \"more depressed in general\"  Affect: somewhat subdued; was congruent to mood; was congruent to content  Thought Process (Associations):  Logical, Linear and Goal directed  Thought process (Rate):  Normal  Thought content:  no overt psychosis, denies suicidal ideation, intent or thoughts and patient does not appear to be responding to internal stimuli  Perception:  Reports none;  Denies depersonalization and derealization  Attention/Concentration:  Normal  Memory:  Immediate recall intact and Short-term memory intact  Language: intact  Fund of Knowledge/Intelligence:  Average  Abstraction:  Normal  Insight:  Good, fair  Judgment:  Good, Fair  Cognition: (6) does  appear grossly intact; formal cognitive testing was not done    Physical Exam     Motor activity/EPS:  Normal  Psychomotor: normal or unremarkable    Labs and Results      Pertinent findings on review include: Review of records with relevant information reported in the HPI.  Reviewed pt's past medical record and obtained collateral information.      [place] PRES[place] MONITORING [place] [] was checked today:  not using controlled substances.          6/29/2021     3:52 PM 11/15/2022     7:09 AM 2/6/2023     5:47 PM   PHQ   PHQ-9 Total Score 8 8 15   Q9: Thoughts of better off dead/self-harm past 2 weeks Not at all Not at all Not at all           11/8/2021     6:52 PM 11/15/2022     7:09 AM 2/2/2023     2:17 PM   JERONIMO-7 " SCORE   Total Score 5 (mild anxiety)  13 (moderate anxiety)   Total Score 5 9 13       Recent Labs   Lab Test 10/10/19  0820   CR 0.86   GFRESTIMATED 85     Recent Labs   Lab Test 10/10/19  0820   AST 20   ALT 20   ALKPHOS 58     TSH: 4.47 (11/15/2022), 5.52 (10/27/2021)    PSYCHOTROPIC DRUG INTERACTIONS:    no.  MANAGEMENT:  N/A    Impression/Assessment      Gaviota Lopez is a 42 year old adult  who presents for med management follow up.  Pt appears somewhat subdued, but not anxious, denies SI, SIB or HI.  Pt decreased Topamax to 100 mg daily after couple weeks since last seen due to oversedation. But oversedation didn't improve significantly though it's better than 125 mg daily.  Pt however noted worsening depression though she initially thought oversedation leading to inability to exercise to keep mood stable.  Reviewed [facility] women's clinic consult note which they recommended trial of Trintellix, Effexor or Seroquel (for anxiety), but since pt is complaining about oversedation, will not recommend Seroquel trial. Discussed trial of Tritellix or Effexor in addition to Topamax, but pt wants to lower Topamax further to see if oversedation can be further improved while monitoring mood. OK to decrease Topamax to 50 mg daily while continuing current Buspar regimen.    Diagnosis                                                                    PMDD  Unspecified eating d/o.  Hx dx of JERONIMO    Treatment Recommendation & Plan       Medication Ordered/Consults/Labs/tests Ordered:     Medication:   -Decrease Topamax to 50 mg daily.  Please monitor oversedation, changes in mood.  -Continue on current Buspar regimen.  OTC Recommendations: none  Lab Orders:  none  Referrals: none  Release of Information: none  Future Treatment Considerations: Per symptoms.   Return for Follow Up: in 4 weeks    -Discussed safety plan for suicidal thoughts  -Discussed plan for suicidality  -Discussed available emergency services  -Patient  agrees with the treatment plan  -Encouraged to continue outpatient therapy to gain more coping mechanism for stress.      Treatment Risk Statement: Discussed with the patient my impressions, as well as recommended studies. I educated patient on the differential diagnosis and prognosis. I discussed with the patient the risks and benefits of medications versus no interventions, including efficacy, dose, possible side effects and length of treatment and the importance of medication compliance.  The patient understands the risks, benefits, adverse effects and alternatives. Agrees to treatment with the capacity to do so. No medical contraindications to treatment. The patient also understands the risks of using street drugs or alcohol.     CRISIS NUMBERS:   Provided routinely in AVS.    Pari Duffy CNP,  05/02/2023

## 2023-05-02 NOTE — PATIENT INSTRUCTIONS
-Decrease Topamax to 50 mg daily.  Please monitor oversedation, changes in mood.  -Continue on current Buspar regimen.    Your next appointment is scheduled on 5/30/2023 (Tue) at 3:30pm.      **For crisis resources, please see the information at the end of this document**   Patient Education    Thank you for coming to the [facility] [place] [facility].     Lab Testing:  If you had lab testing today and your results are reassuring or normal they will be mailed to you or sent through [employer] within 7 days. If the lab tests need quick action we will call you with the results. The phone number we will call with results is # [phone]. If this is not the best number please call our clinic and change the number.     Medication Refills:  If you need any refills please call your pharmacy and they will contact us. Our fax number for refills is [phone].   Three business days of notice are needed for general medication refill requests.   Five business days of notice are needed for controlled substance refill requests.   If you need to change to a different pharmacy, please contact the new pharmacy directly. The new pharmacy will help you get your medications transferred.     Contact Us:  Please call [phone] during business hours (8-5:00 M-F).   If you have medication related questions after clinic hours, or on the weekend, please call [phone].     Financial Assistance [phone]   Medical Records [phone]       MENTAL HEALTH CRISIS RESOURCES:  For a emergency help, please call 911 or go to the nearest Emergency Department.     Emergency Walk-In Options:   Em[facility] Unit @ [facility] ([place]): [phone] - Specialized mental health emergency area designed to be calming  [facility] ([place]): [phone]  [facility] ([place]): [phone]  [facility]): [phone]    [facility] Crisis Information:    [place]: [phone]  [place]: [phone]  [place] ([place]) - Adult: [phone]     Child: [phone]  [place] - Adult: [phone]     Child: [phone]  [place]: [phone]  List of all [facility] resources:   https://[place].gov/dhs/people-we-serve/adults/health-care/mental-health/resources/crisis-contacts.jsp    National Crisis Information:   Crisis Text Line: Text  [place]  to 635009  Suicide & Crisis Lifeline: 988  National Suicide Prevention Lifeline: 4-466-634-TALK (1-869.503.8521)       For online chat options, visit https://suicidepreventionlifeline.org/chat/  Poison Control Center: 5-711-815-7529  Trans Lifeline: 1-481.615.1157 - Hotline for transgender people of all ages  The [employer] Project: 5-074-239-8382 - Hotline for LGBT youth     For Non-Emergency Support:   Fast Tracker: Mental Health & Substance Use Disorder Resources -   https://www.[employer] Socialtrack[employer]ian.org/

## 2023-05-30 ENCOUNTER — VIRTUAL VISIT (OUTPATIENT)
Dept: PSYCHIATRY | Facility: CLINIC | Age: 43
End: 2023-05-30
Attending: NURSE PRACTITIONER
Payer: COMMERCIAL

## 2023-05-30 DIAGNOSIS — F50.9 EATING DISORDER, UNSPECIFIED TYPE: ICD-10-CM

## 2023-05-30 DIAGNOSIS — F32.81 PMDD (PREMENSTRUAL DYSPHORIC DISORDER): Primary | ICD-10-CM

## 2023-05-30 PROCEDURE — 99214 OFFICE O/P EST MOD 30 MIN: CPT | Mod: VID | Performed by: NURSE PRACTITIONER

## 2023-05-30 RX ORDER — TOPIRAMATE 50 MG/1
50 TABLET, FILM COATED ORAL DAILY
Qty: 90 TABLET | Refills: 0 | Status: SHIPPED | OUTPATIENT
Start: 2023-05-30 | End: 2023-08-29

## 2023-05-30 NOTE — PATIENT INSTRUCTIONS
-Continue on current medication regimen.    Your next appointment is scheduled on 8/29/2023 (Tue) at 3:30pm.      **For crisis resources, please see the information at the end of this document**   Patient Education    Thank you for coming to the [facility].     Lab Testing:  If you had lab testing today and your results are reassuring or normal they will be mailed to you or sent through [employer] within 7 days. If the lab tests need quick action we will call you with the results. The phone number we will call with results is # [phone]. If this is not the best number please call our clinic and change the number.     Medication Refills:  If you need any refills please call your pharmacy and they will contact us. Our fax number for refills is [phone].   Three business days of notice are needed for general medication refill requests.   Five business days of notice are needed for controlled substance refill requests.   If you need to change to a different pharmacy, please contact the new pharmacy directly. The new pharmacy will help you get your medications transferred.     Contact Us:  Please call [phone] during business hours (8-5:00 M-F).   If you have medication related questions after clinic hours, or on the weekend, please call [phone].     Financial Assistance [phone]   Medical Records [phone]       MENTAL HEALTH CRISIS RESOURCES:  For a emergency help, please call 911 or go to the nearest Emergency Department.     Emergency Walk-In Options:   [facility] @ [facility] ([place]): [phone] - Specialized mental health emergency area designed to be calming  [facility] ([place]): [phone]  [facility] ([place]): [phone]  [facility]): [phone]    [facility] Crisis Information:   [place]: [phone]  [place]: [phone]  [place] - Adult:  [phone]     Child: [phone]  [place] - Adult: [phone]     Child: [phone]  [place]: [phone]  List of all [facility] resources:   https://[place].gov/dhs/people-we-serve/adults/health-care/mental-health/resources/crisis-contacts.jsp    National Crisis Information:   Crisis Text Line: Text  [place]  to 217977  Suicide & Crisis Lifeline: 988  National Suicide Prevention Lifeline: 1-676-914-TALK (1-535.533.7583)       For online chat options, visit https://suicidepreventionlifeline.org/chat/  Poison Control Center: 1-398.180.4433  Trans Lifeline: 1-913.178.8558 - Hotline for transgender people of all ages  [employer]: 3-104-737-6110 - Hotline for LGBT youth     For Non-Emergency Support:   Fast Tracker: Mental Health & Substance Use Disorder Resources -   https://www.[facility].org/

## 2023-05-30 NOTE — PROGRESS NOTES
Virtual Visit Details    Type of service:  Video Visit     Originating Location (pt. Location): Home  Distant Location (provider location):  On-site  Platform used for Video Visit: [facility]      Psychiatry Clinic Progress Note                                                                  Patient Name: Gaviota Lopez  YOB: 1980  MRN: [id number]  Date of Service: 05/30/2023  Last Seen:5/2/2023    Gaviota Lopez is a 42 year old person assigned female at birth, identifies as nonbinary/gender queer who uses the name Gaviota and pronoun she.      Gaviota Lopez is a 42 year old year old adult who presents for ongoing psychiatric care.  Gaviota Lopez was last seen on  5/2/2023.    At that time,       Medication Ordered/Consults/Labs/tests Ordered:     Medication:   -Decrease Topamax to 50 mg daily.  Please monitor oversedation, changes in mood.  -Continue on current Buspar regimen.  OTC Recommendations: none  Lab Orders:  none  Referrals: none  Release of Information: none  Future Treatment Considerations: Per symptoms.   Return for Follow Up: in 4 weeks    Pertinent Background:  Diagnoses include PMDD, anxiety, depression.  Psych critical item history includes mutiple psychotropic trials and eating disorder (Bullimia).  Hx of orthostatic hypotension.     Previous Psychiatric Meds: Ativan (helpful only when menstrual cycle can be predictable, also built tolerance over time), Beatris (mood exacerbation), Zoloft (not effective), Prozac (not effective), Trazodone (oversedation at 100 mg, not effective at 50mg), Pristiq (not effective), elavil (helped sleep only), Vyvance (helpful for anxiety at 20 mg in AM, but sleep cycle worsening), Buspar (increased anxiety at 30 mg BID)    Interim History                                                                                                        4, 4     Since the last visit,  -Reports doing OK.  -With lower Topamax, oversedation is no  longer a concern.  -Feels mood is OK.  Feels 1st half of the month, mood is significantly better with more energy.  2nd half of the month, when typically experiences PMDD sxs, mood is not worse compared to higher dose of Topamax, denies Si, SIB or HI.  -But 2nd half of the month, experiences more middle insomnia.  Harder to go back to sleep due to anxiety.  Also having vivid dreams.  -Hx of disordered eating and orthostatic hypotension.  -Want to continue on current medication regimen longer and see if mood will continue to be stable.    Denies any symptoms suggestive of hypomania or psychosis.    Current Suicidality/Hx of Suicide Attempts: Denies both  CoCominent Medical concerns: none    Medication Side Effects: The patient denies all medication side effects.      Medical Review of Systems     Apart from the symptoms mentioned int he HPI, the 14 point review of systems, including constitutional, HEENT, cardiovascular, respiratory, gastrointestinal, genitourinary, musculoskeletal, integumentary, endocrine, neurological, hematologic and allergic is entirely negative.    Pregnant: None. Nursing: None, Contraception: not currently sexually active    Substance Use   Pt has been staying substance free since last seen.      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    Allergy                                Cephalosporins    Current Medications                                                                                                       Current Outpatient Medications   Medication Sig Dispense Refill     albuterol (PROAIR RESPICLICK) 108 (90 Base) MCG/ACT inhaler Inhale 2 puffs into the lungs every 6 hours as needed for shortness of breath / dyspnea or wheezing 1 Inhaler 0  "    busPIRone (BUSPAR) 10 MG tablet Take 1 tablet by mouth 2 times a day. Take with one 15 mg tablet to make total of 25 mg 2 times a day 180 tablet 3     busPIRone (BUSPAR) 15 MG tablet Take 1 tablet by mouth 2 times a day. Take with one 10 mg tab to make total of 25 mg 2 times a day. 180 tablet 3     fluticasone (FLONASE) 50 MCG/ACT nasal spray Spray 1 spray into both nostrils daily       loratadine (CLARITIN) 10 MG tablet Take 10 mg by mouth daily       topiramate (TOPAMAX) 50 MG tablet Take 1 tablet (50 mg) by mouth daily 30 tablet 1          Mental Status Exam                                                                                   9, 14 cog        Alertness: alert  and oriented  Appearance:  Casually dressed and Well groomed  Behavior/Demeanor: cooperative, pleasant and calm, with good  eye contact   Speech: regular rate and rhythm  Mood :  \"OK, 1st half of the month, mood is significantly better\"  Affect: mostly euthymic, was congruent to mood; was congruent to content  Thought Process (Associations):  Logical, Linear and Goal directed  Thought process (Rate):  Normal  Thought content:  no overt psychosis, denies suicidal ideation, intent or thoughts and patient does not appear to be responding to internal stimuli  Perception:  Reports none;  Denies depersonalization and derealization  Attention/Concentration:  Normal  Memory:  Immediate recall intact and Short-term memory intact  Language: intact  Fund of Knowledge/Intelligence:  Average  Abstraction:  Normal  Insight:  Good, fair  Judgment:  Good, Fair  Cognition: (6) does  appear grossly intact; formal cognitive testing was not done    Physical Exam     Motor activity/EPS:  Normal  Psychomotor: normal or unremarkable    Labs and Results      Pertinent findings on review include: Review of records with relevant information reported in the HPI.  Reviewed pt's past medical record and obtained collateral information.      [place] PRESCRIPTION MONITORING " PROGRAM [] was checked today:  not using controlled substances.          6/29/2021     3:52 PM 11/15/2022     7:09 AM 2/6/2023     5:47 PM   PHQ   PHQ-9 Total Score 8 8 15   Q9: Thoughts of better off dead/self-harm past 2 weeks Not at all Not at all Not at all           11/8/2021     6:52 PM 11/15/2022     7:09 AM 2/2/2023     2:17 PM   JERONIMO-7 SCORE   Total Score 5 (mild anxiety)  13 (moderate anxiety)   Total Score 5 9 13       Recent Labs   Lab Test 10/10/19  0820   CR 0.86   GFRESTIMATED 85     Recent Labs   Lab Test 10/10/19  0820   AST 20   ALT 20   ALKPHOS 58     TSH: 4.47 (11/15/2022), 5.52 (10/27/2021)    PSYCHOTROPIC DRUG INTERACTIONS:    no.  MANAGEMENT:  N/A    Impression/Assessment      Gaviota Lopez is a 42 year old adult  who presents for med management follow up.  Pt appears mostly euthymic, not anxious, denies SI, SIB or HI. Pt noted resolution of oversedation with Topamax 50 mg daily without exacerbating mood.  In fact, pt noted improvement of mood and energy in follicular phase while no worsening mood during luteal phase than baseline luteal phase mood lowering, but noted some middle insomnia with anxiety during luteal phase. Discussed possibility of Gabapentin trial for HS PRN during luteal phase, but pt is concerned about possible brain fog with Topamax. Pt noted hx of orthostatic hypotension, thus not a good candidate for Clonidine or Propranolol. Pt also does not want any mediation that could possibly gain weight, thus Seroquel will not be a good option.  At this time, pt wants to continue on current medication regimen while continuing to monitor sleep and mood. Ok to continue on current medication regimen.    Diagnosis                                                                    PMDD  Unspecified eating d/o.  Hx dx of JERONIMO    Treatment Recommendation & Plan       Medication Ordered/Consults/Labs/tests Ordered:     Medication: Continue on current medication regimen.  OTC  Recommendations: none  Lab Orders:  none  Referrals: none  Release of Information: none  Future Treatment Considerations: Per symptoms.   Return for Follow Up: in 3 months    -Discussed safety plan for suicidal thoughts  -Discussed plan for suicidality  -Discussed available emergency services  -Patient agrees with the treatment plan  -Encouraged to continue outpatient therapy to gain more coping mechanism for stress.      Treatment Risk Statement: Discussed with the patient my impressions, as well as recommended studies. I educated patient on the differential diagnosis and prognosis. I discussed with the patient the risks and benefits of medications versus no interventions, including efficacy, dose, possible side effects and length of treatment and the importance of medication compliance.  The patient understands the risks, benefits, adverse effects and alternatives. Agrees to treatment with the capacity to do so. No medical contraindications to treatment. The patient also understands the risks of using street drugs or alcohol.     CRISIS NUMBERS:   Provided routinely in AVS.    Pari Duffy CNP,  05/30/2023

## 2023-05-30 NOTE — NURSING NOTE
Is the patient currently in the state of [place]? YES    Visit mode:VIDEO    If the visit is dropped, the patient can be reconnected by: VIDEO VISIT: Send to e-mail at: [email or web address]    Will anyone else be joining the visit? NO      How would you like to obtain your AVS? MyChart    Are changes needed to the allergy or medication list? NO    Reason for visit: DIDIER Horner on 5/30/2023 at 3:24 PM

## 2023-08-29 ENCOUNTER — VIRTUAL VISIT (OUTPATIENT)
Dept: PSYCHIATRY | Facility: CLINIC | Age: 43
End: 2023-08-29
Attending: NURSE PRACTITIONER
Payer: COMMERCIAL

## 2023-08-29 DIAGNOSIS — F32.81 PMDD (PREMENSTRUAL DYSPHORIC DISORDER): Primary | ICD-10-CM

## 2023-08-29 DIAGNOSIS — F50.9 EATING DISORDER, UNSPECIFIED TYPE: ICD-10-CM

## 2023-08-29 PROCEDURE — 99214 OFFICE O/P EST MOD 30 MIN: CPT | Mod: VID | Performed by: NURSE PRACTITIONER

## 2023-08-29 RX ORDER — TOPIRAMATE 50 MG/1
50 TABLET, FILM COATED ORAL DAILY
Qty: 90 TABLET | Refills: 1 | Status: SHIPPED | OUTPATIENT
Start: 2023-08-29 | End: 2023-11-20

## 2023-08-29 ASSESSMENT — PAIN SCALES - GENERAL: PAINLEVEL: NO PAIN (0)

## 2023-08-29 NOTE — PROGRESS NOTES
Virtual Visit Details    Type of service:  Video Visit     Originating Location (pt. Location): Home  Distant Location (provider location):  On-site  Platform used for Video Visit: [facility]      Psychiatry Clinic Progress Note                                                                  Patient Name: Gaviota Lopez  YOB: 1980  MRN: [id number]  Date of Service: 08/29/2023  Last Seen:5/30/2023    Gaviota Lopez is a 42 year old person assigned female at birth, identifies as nonbinary/gender queer who uses the name Gaviota and pronoun she.      Gaviota Lopez is a 42 year old year old adult who presents for ongoing psychiatric care.  Gaviota Lopez was last seen on  5/30/2023.    At that time,     Medication Ordered/Consults/Labs/tests Ordered:     Medication: Continue on current medication regimen.  OTC Recommendations: none  Lab Orders:  none  Referrals: none  Release of Information: none  Future Treatment Considerations: Per symptoms.   Return for Follow Up: in 3 months    Pertinent Background:  Diagnoses include PMDD, anxiety, depression.  Psych critical item history includes mutiple psychotropic trials and eating disorder (Bullimia).  Hx of orthostatic hypotension.     Previous Psychiatric Meds: Ativan (helpful only when menstrual cycle can be predictable, also built tolerance over time), Beatris (mood exacerbation), Zoloft (not effective), Prozac (not effective), Trazodone (oversedation at 100 mg, not effective at 50mg), Pristiq (not effective), elavil (helped sleep only), Vyvance (helpful for anxiety at 20 mg in AM, but sleep cycle worsening), Buspar (increased anxiety at 30 mg BID)    Interim History                                                                                                        4, 4     Since the last visit,  -Reports doing OK.  -Continues to experience PMDD, but feels sxs are manageable.  Hoping to have menopause soon, not doing any hormonal  treatment.  -But is possibly interested in trial of Zuranolone in the future if approved for PMDD.  -Wants to continue on current medication regimen since feels sxs are manageable.  -Also open to transfer to PCP if no medication changes as long as she can return to my panel.    Denies any symptoms suggestive of hypomania or psychosis.    Current Suicidality/Hx of Suicide Attempts: Denies both  CoCominent Medical concerns: none    Medication Side Effects: The patient denies all medication side effects.      Medical Review of Systems     Apart from the symptoms mentioned int he HPI, the 14 point review of systems, including constitutional, HEENT, cardiovascular, respiratory, gastrointestinal, genitourinary, musculoskeletal, integumentary, endocrine, neurological, hematologic and allergic is entirely negative.    Pregnant: None. Nursing: None, Contraception: not currently sexually active    Substance Use   Pt has been staying substance free since last seen.      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    Allergy                                Cephalosporins    Current Medications                                                                                                       Current Outpatient Medications   Medication Sig Dispense Refill    albuterol (PROAIR RESPICLICK) 108 (90 Base) MCG/ACT inhaler Inhale 2 puffs into the lungs every 6 hours as needed for shortness of breath / dyspnea or wheezing 1 Inhaler 0    busPIRone (BUSPAR) 10 MG tablet Take 1 tablet by mouth 2 times a day. Take with one 15 mg tablet to make total of 25 mg 2 times a day 180 tablet 3    busPIRone (BUSPAR) 15 MG tablet Take 1 tablet by mouth 2 times a day. Take with one 10 mg tab to make total of 25  "mg 2 times a day. 180 tablet 3    fluticasone (FLONASE) 50 MCG/ACT nasal spray Spray 1 spray into both nostrils daily      loratadine (CLARITIN) 10 MG tablet Take 10 mg by mouth daily      topiramate (TOPAMAX) 50 MG tablet Take 1 tablet (50 mg) by mouth daily 90 tablet 0          Mental Status Exam                                                                                   9, 14 cog        Alertness: alert  and oriented  Appearance:  Casually dressed and Well groomed  Behavior/Demeanor: cooperative, pleasant and calm, with good  eye contact   Speech: regular rate and rhythm  Mood :  \"OK\"\"  Affect: mostly euthymic, was congruent to mood; was congruent to content  Thought Process (Associations):  Logical, Linear and Goal directed  Thought process (Rate):  Normal  Thought content:  no overt psychosis, denies suicidal ideation, intent or thoughts and patient does not appear to be responding to internal stimuli  Perception:  Reports none;  Denies depersonalization and derealization  Attention/Concentration:  Normal  Memory:  Immediate recall intact and Short-term memory intact  Language: intact  Fund of Knowledge/Intelligence:  Average  Abstraction:  Normal  Insight:  Good, fair  Judgment:  Good, Fair  Cognition: (6) does  appear grossly intact; formal cognitive testing was not done    Physical Exam     Motor activity/EPS:  Normal  Psychomotor: normal or unremarkable    Labs and Results      Pertinent findings on review include: Review of records with relevant information reported in the HPI.  Reviewed pt's past medical record and obtained collateral information.      [place] PRES[place] MONITORING [place] [] was checked today:  not using controlled substances.          6/29/2021     3:52 PM 11/15/2022     7:09 AM 2/6/2023     5:47 PM   PHQ   PHQ-9 Total Score 8 8 15   Q9: Thoughts of better off dead/self-harm past 2 weeks Not at all Not at all Not at all           11/8/2021     6:52 PM 11/15/2022     7:09 AM " 2/2/2023     2:17 PM   JERONIMO-7 SCORE   Total Score 5 (mild anxiety)  13 (moderate anxiety)   Total Score 5 9 13       Recent Labs   Lab Test 10/10/19  0820   CR 0.86   GFRESTIMATED 85     Recent Labs   Lab Test 10/10/19  0820   AST 20   ALT 20   ALKPHOS 58     TSH: 4.47 (11/15/2022), 5.52 (10/27/2021)    PSYCHOTROPIC DRUG INTERACTIONS:    no.  MANAGEMENT:  N/A    Impression/Assessment      Gaviota Lopez is a 42 year old adult  who presents for med management follow up.  Pt appears mostly euthymic, not anxious, denies SI, SIB or HI. Pt noted continued PMDD sxs, but they are manageable. Does not want to try any hormonal medication trial including IUD at this time as Beatris trial was awful. Hoping to have early menopause as mother had menopause in mid 40's. Pt was interested possibly in the future for trial of Zuranolone if approved for PMDD in the future. Pt wants to continue on current medication regimen as symptoms are manageable. Will transfer care to PCP and if needed to return to my care.    Diagnosis                                                                    PMDD  Unspecified eating d/o.  Hx dx of JERONIMO    Treatment Recommendation & Plan       Medication Ordered/Consults/Labs/tests Ordered:     Medication: Continue on current medication regimen.  OTC Recommendations: none  Lab Orders:  none  Referrals: none  Release of Information: none  Future Treatment Considerations: Per symptoms.   Return for Follow Up: transfer to PCP    -Discussed safety plan for suicidal thoughts  -Discussed plan for suicidality  -Discussed available emergency services  -Patient agrees with the treatment plan  -Encouraged to continue outpatient therapy to gain more coping mechanism for stress.      Treatment Risk Statement: Discussed with the patient my impressions, as well as recommended studies. I educated patient on the differential diagnosis and prognosis. I discussed with the patient the risks and benefits of medications  versus no interventions, including efficacy, dose, possible side effects and length of treatment and the importance of medication compliance.  The patient understands the risks, benefits, adverse effects and alternatives. Agrees to treatment with the capacity to do so. No medical contraindications to treatment. The patient also understands the risks of using street drugs or alcohol.     CRISIS NUMBERS:   Provided routinely in AVS.    Pari Duffy CNP,  08/29/2023

## 2023-08-29 NOTE — NURSING NOTE
Is the patient currently in the state of [place]? YES    Visit mode:VIDEO    If the visit is dropped, the patient can be reconnected by: VIDEO VISIT: Send to e-mail at: [email or web address]    Will anyone else be joining the visit? NO  (If patient encounters technical issues they should call [phone]    How would you like to obtain your AVS? MyChart    Are changes needed to the allergy or medication list? No    Reason for visit: RECHECK    PHQ not complete per department protocol.    Trena EPSTEIN

## 2023-08-29 NOTE — PATIENT INSTRUCTIONS
-Continue on current medication regimen.    -Transfer to primary care.  If you need to see me, you can call the clinic to return to my care.    **For crisis resources, please see the information at the end of this document**   Patient Education    Thank you for coming to the [facility] [place] [facility].     Lab Testing:  If you had lab testing today and your results are reassuring or normal they will be mailed to you or sent through [facility] within 7 days. If the lab tests need quick action we will call you with the results. The phone number we will call with results is # [phone]. If this is not the best number please call our clinic and change the number.     Medication Refills:  If you need any refills please call your pharmacy and they will contact us. Our fax number for refills is [phone].   Three business days of notice are needed for general medication refill requests.   Five business days of notice are needed for controlled substance refill requests.   If you need to change to a different pharmacy, please contact the new pharmacy directly. The new pharmacy will help you get your medications transferred.     Contact Us:  Please call [phone] during business hours (8-5:00 M-F).   If you have medication related questions after clinic hours, or on the weekend, please call [phone].     Financial Assistance [phone]   Medical Records [phone]       MENTAL HEALTH CRISIS RESOURCES:  For a emergency help, please call 911 or go to the nearest Emergency Department.     Emergency Walk-In Options:   Em[facility] Unit @ [facility] ([place]): [phone] - Specialized mental health emergency area designed to be calming  [facility] ([place]): [phone]  [facility] ([place]): [phone]  [facility]): [phone]    [facility] Crisis Information:   [place]: [phone]  [place]:  [phone]  [place] (COPE) - Adult: [phone]     Child: [phone]  [place] - Adult: [phone]     Child: [phone]  [place]: [phone]  List of all [facility] resources:   https://[place].gov/dhs/people-we-serve/adults/health-care/mental-health/resources/crisis-contacts.jsp    National Crisis Information:   Crisis Text Line: Text  [place]  to 470684  Suicide & Crisis Lifeline: 988  National Suicide Prevention Lifeline: 4-240-912-TALK (1-850.915.1528)       For online chat options, visit https://suicidepreventionlifeline.org/chat/  Poison Control Center: 1-299.566.3397  Trans Lifeline: 1-565.396.4247 - Hotline for transgender people of all ages  [employer]: 3-185-307-7908 - Hotline for LGBT youth     For Non-Emergency Support:   Fast Tracker: Mental Health & Substance Use Disorder Resources -   https://www.[employer].[email or web address]/

## 2023-10-25 ENCOUNTER — TRANSFERRED RECORDS (OUTPATIENT)
Dept: HEALTH INFORMATION MANAGEMENT | Facility: CLINIC | Age: 43
End: 2023-10-25
Payer: COMMERCIAL

## 2023-11-13 ASSESSMENT — ENCOUNTER SYMPTOMS
JOINT SWELLING: 0
COUGH: 0
DIARRHEA: 0
DIZZINESS: 0
FREQUENCY: 0
CONSTIPATION: 0
BREAST MASS: 0
NAUSEA: 0
EYE PAIN: 0
ARTHRALGIAS: 0
HEMATOCHEZIA: 0
CHILLS: 0
DYSURIA: 0
NERVOUS/ANXIOUS: 0
MYALGIAS: 0
FEVER: 0
SORE THROAT: 0
HEADACHES: 0
PALPITATIONS: 0
SHORTNESS OF BREATH: 0
ABDOMINAL PAIN: 0
WEAKNESS: 0
HEMATURIA: 0
HEARTBURN: 0
PARESTHESIAS: 0

## 2023-11-19 ASSESSMENT — ANXIETY QUESTIONNAIRES
3. WORRYING TOO MUCH ABOUT DIFFERENT THINGS: SEVERAL DAYS
7. FEELING AFRAID AS IF SOMETHING AWFUL MIGHT HAPPEN: SEVERAL DAYS
GAD7 TOTAL SCORE: 7
5. BEING SO RESTLESS THAT IT IS HARD TO SIT STILL: SEVERAL DAYS
GAD7 TOTAL SCORE: 7
1. FEELING NERVOUS, ANXIOUS, OR ON EDGE: SEVERAL DAYS
6. BECOMING EASILY ANNOYED OR IRRITABLE: SEVERAL DAYS
IF YOU CHECKED OFF ANY PROBLEMS ON THIS QUESTIONNAIRE, HOW DIFFICULT HAVE THESE PROBLEMS MADE IT FOR YOU TO DO YOUR WORK, TAKE CARE OF THINGS AT HOME, OR GET ALONG WITH OTHER PEOPLE: SOMEWHAT DIFFICULT
2. NOT BEING ABLE TO STOP OR CONTROL WORRYING: SEVERAL DAYS
4. TROUBLE RELAXING: SEVERAL DAYS

## 2023-11-19 ASSESSMENT — PATIENT HEALTH QUESTIONNAIRE - PHQ9
SUM OF ALL RESPONSES TO PHQ QUESTIONS 1-9: 6
SUM OF ALL RESPONSES TO PHQ QUESTIONS 1-9: 6
10. IF YOU CHECKED OFF ANY PROBLEMS, HOW DIFFICULT HAVE THESE PROBLEMS MADE IT FOR YOU TO DO YOUR WORK, TAKE CARE OF THINGS AT HOME, OR GET ALONG WITH OTHER PEOPLE: SOMEWHAT DIFFICULT

## 2023-11-20 ENCOUNTER — OFFICE VISIT (OUTPATIENT)
Dept: FAMILY MEDICINE | Facility: CLINIC | Age: 43
End: 2023-11-20
Payer: COMMERCIAL

## 2023-11-20 VITALS
HEIGHT: 64 IN | SYSTOLIC BLOOD PRESSURE: 121 MMHG | TEMPERATURE: 97.4 F | OXYGEN SATURATION: 98 % | HEART RATE: 62 BPM | RESPIRATION RATE: 18 BRPM | BODY MASS INDEX: 19.12 KG/M2 | WEIGHT: 112 LBS | DIASTOLIC BLOOD PRESSURE: 68 MMHG

## 2023-11-20 DIAGNOSIS — E03.8 SUBCLINICAL HYPOTHYROIDISM: ICD-10-CM

## 2023-11-20 DIAGNOSIS — E78.5 HYPERLIPIDEMIA LDL GOAL <100: ICD-10-CM

## 2023-11-20 DIAGNOSIS — F50.9 EATING DISORDER, UNSPECIFIED TYPE: ICD-10-CM

## 2023-11-20 DIAGNOSIS — Z00.00 ROUTINE HISTORY AND PHYSICAL EXAMINATION OF ADULT: Primary | ICD-10-CM

## 2023-11-20 DIAGNOSIS — F32.81 PMDD (PREMENSTRUAL DYSPHORIC DISORDER): ICD-10-CM

## 2023-11-20 LAB
ALBUMIN SERPL BCG-MCNC: 4.7 G/DL (ref 3.5–5.2)
ALP SERPL-CCNC: 55 U/L (ref 40–150)
ALT SERPL W P-5'-P-CCNC: 19 U/L (ref 0–70)
ANION GAP SERPL CALCULATED.3IONS-SCNC: 15 MMOL/L (ref 7–15)
AST SERPL W P-5'-P-CCNC: 31 U/L (ref 0–45)
BILIRUB SERPL-MCNC: 0.7 MG/DL
BUN SERPL-MCNC: 17 MG/DL (ref 6–20)
CALCIUM SERPL-MCNC: 10 MG/DL (ref 8.6–10)
CHLORIDE SERPL-SCNC: 103 MMOL/L (ref 98–107)
CHOLEST SERPL-MCNC: 227 MG/DL
CREAT SERPL-MCNC: 0.97 MG/DL (ref 0.51–1.17)
DEPRECATED HCO3 PLAS-SCNC: 22 MMOL/L (ref 22–29)
EGFRCR SERPLBLD CKD-EPI 2021: 74 ML/MIN/1.73M2
GLUCOSE SERPL-MCNC: 77 MG/DL (ref 70–99)
HBA1C MFR BLD: 4.9 %
HDLC SERPL-MCNC: 69 MG/DL
LDLC SERPL CALC-MCNC: 148 MG/DL
NONHDLC SERPL-MCNC: 158 MG/DL
POTASSIUM SERPL-SCNC: 3.5 MMOL/L (ref 3.4–5.3)
PROT SERPL-MCNC: 7.6 G/DL (ref 6.4–8.3)
SODIUM SERPL-SCNC: 140 MMOL/L (ref 135–145)
TRIGL SERPL-MCNC: 49 MG/DL
TSH SERPL DL<=0.005 MIU/L-ACNC: 3.93 UIU/ML (ref 0.3–4.2)

## 2023-11-20 PROCEDURE — 84443 ASSAY THYROID STIM HORMONE: CPT | Mod: ORL | Performed by: NURSE PRACTITIONER

## 2023-11-20 PROCEDURE — 80053 COMPREHEN METABOLIC PANEL: CPT | Mod: ORL | Performed by: NURSE PRACTITIONER

## 2023-11-20 PROCEDURE — 87624 HPV HI-RISK TYP POOLED RSLT: CPT | Mod: ORL | Performed by: NURSE PRACTITIONER

## 2023-11-20 PROCEDURE — 83036 HEMOGLOBIN GLYCOSYLATED A1C: CPT | Mod: ORL | Performed by: NURSE PRACTITIONER

## 2023-11-20 PROCEDURE — G0145 SCR C/V CYTO,THINLAYER,RESCR: HCPCS | Mod: ORL | Performed by: NURSE PRACTITIONER

## 2023-11-20 PROCEDURE — 80061 LIPID PANEL: CPT | Mod: ORL | Performed by: NURSE PRACTITIONER

## 2023-11-20 RX ORDER — BUSPIRONE HYDROCHLORIDE 10 MG/1
TABLET ORAL
Qty: 180 TABLET | Refills: 3 | Status: SHIPPED | OUTPATIENT
Start: 2023-11-20

## 2023-11-20 RX ORDER — TOPIRAMATE 50 MG/1
50 TABLET, FILM COATED ORAL DAILY
Qty: 90 TABLET | Refills: 1 | Status: SHIPPED | OUTPATIENT
Start: 2023-11-20

## 2023-11-20 RX ORDER — BUSPIRONE HYDROCHLORIDE 15 MG/1
TABLET ORAL
Qty: 180 TABLET | Refills: 3 | Status: SHIPPED | OUTPATIENT
Start: 2023-11-20

## 2023-11-20 ASSESSMENT — ENCOUNTER SYMPTOMS
WEAKNESS: 0
DIARRHEA: 0
DIZZINESS: 0
EYES NEGATIVE: 1
NEUROLOGICAL NEGATIVE: 1
FREQUENCY: 0
HEMATOLOGIC/LYMPHATIC NEGATIVE: 1
HEMATURIA: 0
HEADACHES: 0
NAUSEA: 0
PALPITATIONS: 0
MUSCULOSKELETAL NEGATIVE: 1
DYSURIA: 0
CONSTITUTIONAL NEGATIVE: 1
JOINT SWELLING: 0
MYALGIAS: 0
ABDOMINAL PAIN: 0
ARTHRALGIAS: 0
SHORTNESS OF BREATH: 0
CARDIOVASCULAR NEGATIVE: 1
COUGH: 0
NERVOUS/ANXIOUS: 1
EYE PAIN: 0
RESPIRATORY NEGATIVE: 1
ENDOCRINE NEGATIVE: 1
CONSTIPATION: 1
CHILLS: 0
FEVER: 0
SORE THROAT: 0

## 2023-11-20 ASSESSMENT — PAIN SCALES - GENERAL: PAINLEVEL: NO PAIN (0)

## 2023-11-20 NOTE — PROGRESS NOTES
"     ANNUAL WELLNESS EXAM     Today's Date: Nov 20, 2023     Patient Gaviota Lopez 1980 presents to the clinic today for a preventative health visit.         SUBJECTIVE     History of Present Illness:    Gaviota is doing well, she is here for a health maintenance and physical exam.  She is also requesting refills on her medications.  She feels that she is doing well with the medications, she takes them as prescribed.  She has a history of PMDD, anxiety and an eating disorder, she states they are kept \"at bay\" with her medications.  She did go to the [facility] for eating disorder counseling and knows if things are not right for her.    Gaviota has a beta agonist inhaler, she may use it one time per year if she has an upper respiratory infection.  She has a history of seasonal and cat allergies, she actually is doing better with the cat after living with it for a time being.     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        Allergies   Allergen Reactions     Cephalosporins Hives        Current Outpatient Medications   Medication Instructions     albuterol (PROAIR RESPICLICK) 108 (90 Base) MCG/ACT inhaler 2 puffs, Inhalation, EVERY 6 HOURS PRN     busPIRone (BUSPAR) 10 MG tablet Take 1 tablet by mouth 2 times a day. Take with one 15 mg tablet to make total of 25 mg 2 times a day     busPIRone (BUSPAR) 15 MG tablet Take 1 tablet by mouth 2 times a day. Take with one 10 mg tab to make total of 25 mg 2 times a day.     fluticasone (FLONASE) 50 MCG/ACT nasal spray 1 spray, Both Nostrils, DAILY     loratadine (CLARITIN) 10 mg, Oral, DAILY     topiramate (TOPAMAX) 50 mg, Oral, DAILY       Past Medical History:   Diagnosis Date     Allergic rhinitis, cause unspecified      Anxiety      Eating disorder     Bulimia     PMDD (premenstrual dysphoric disorder)      Raynaud disease      Subclinical hypothyroidism      Unspecified asthma(493.90)         Family History " "  Problem Relation Age of Onset     Breast Cancer Mother         age 68     No Known Problems Father      No Known Problems Brother      Diabetes Maternal Grandfather      Hypertension Maternal Grandfather      C.A.D. Paternal Grandfather      Cancer - colorectal Paternal Grandfather         polyps     Diabetes Maternal Uncle      Hypertension Maternal Uncle         Do you have a first-degree relative with a history of the following:  A. Cancer of the breast or ovaries - Yes   B. Heart attack, heart pain, or stroke before the age of 55 - No  C. Unexplained death from drowning or car accident - No  D. Osteoporosis or any other significant bone health concerns - Yes    Social History     Tobacco Use     Smoking status: Never     Smokeless tobacco: Never   Vaping Use     Vaping Use: Never used   Substance Use Topics     Alcohol use: No     Drug use: No        History   Sexual Activity     Sexual activity: Yes     Partners: Female     Birth control/ protection: None     Comment: Practices oral, toys, and manual stimulation for sex.             11/15/2022     7:09 AM 2/6/2023     5:47 PM 11/19/2023     9:35 PM   PHQ   PHQ-9 Total Score 8 15 6   Q9: Thoughts of better off dead/self-harm past 2 weeks Not at all Not at all Not at all        Immunization History   Administered Date(s) Administered     COVID-19 Bivalent 18+ (Moderna) 09/09/2022     COVID-19 MONOVALENT 12+ (Pfizer) 01/14/2021, 02/03/2021, 10/13/2021     DTaP, Unspecified 06/04/2015     Influenza (IIV3) PF 11/29/2001, 12/22/2004     Influenza Vaccine >6 months (Alfuria,Fluzone) 09/25/2020        Health Maintenance Due   Topic Date Due     COVID-19 Vaccine (6 - 2023-24 season) 09/01/2023     MAMMO SCREENING  10/05/2023     YEARLY PREVENTIVE VISIT  11/15/2023      Health Maintenance components reviewed - Seasonal Influenza vaccine status is up to date & Covid-19 vaccine status is up to date.    Diet: in general, a \"healthy\" diet      Exercise: 6-7 days/week for " "an average of 45-60 minutes    LMP:  One week ago     Review of Systems   Constitutional: Negative.  Negative for chills and fever.   HENT: Negative.  Negative for congestion, ear pain, hearing loss and sore throat.    Eyes: Negative.  Negative for pain and visual disturbance.   Respiratory: Negative.  Negative for cough and shortness of breath.    Cardiovascular: Negative.  Negative for chest pain and palpitations.   Gastrointestinal: Positive for constipation. Negative for abdominal pain, diarrhea and nausea.   Endocrine: Negative.    Genitourinary: Negative.  Negative for dysuria, frequency, genital sores, hematuria and urgency.   Musculoskeletal: Negative.  Negative for arthralgias, joint swelling and myalgias.   Skin: Negative.  Negative for rash.   Neurological: Negative.  Negative for dizziness, weakness and headaches.   Hematological: Negative.    Psychiatric/Behavioral: The patient is nervous/anxious.         History of anxiety and PMDD            OBJECTIVE     /68 (BP Location: Left arm, Patient Position: Sitting, Cuff Size: Adult Small)   Pulse 62   Temp 97.4  F (36.3  C) (Oral)   Resp 18   Ht 1.636 m (5' 4.4\")   Wt 50.8 kg (112 lb)   SpO2 98%   BMI 18.99 kg/m         Estimated body mass index is 18.99 kg/m  as calculated from the following:    Height as of this encounter: 1.636 m (5' 4.4\").    Weight as of this encounter: 50.8 kg (112 lb).    Complete \"Weight Managment Plan\" in the progress note from the Adult Preventative or Medicare smartsets, use phrase .WEIGHTPLAN, or choose an option from Weight Management Resources smartset below.        Labs:  Lab Results   Component Value Date    WBC 7.0 10/10/2019    HGB 13.0 11/15/2022    HCT 40.4 10/10/2019     10/10/2019    CHOL 207 (H) 11/15/2022    TRIG 87 11/15/2022    HDL 59 11/15/2022    ALT 20 10/10/2019    AST 20 10/10/2019     10/10/2019    BUN 22 10/10/2019    CO2 24 10/10/2019    TSH 4.47 (H) 11/15/2022       Physical " Exam  Exam conducted with a chaperone present.   Constitutional:       Appearance: Normal appearance. She is normal weight.   HENT:      Head: Normocephalic and atraumatic.      Right Ear: Tympanic membrane, ear canal and external ear normal.      Left Ear: Tympanic membrane, ear canal and external ear normal.      Nose: Nose normal.      Mouth/Throat:      Mouth: Mucous membranes are moist.      Pharynx: Oropharynx is clear.   Eyes:      Extraocular Movements: Extraocular movements intact.      Conjunctiva/sclera: Conjunctivae normal.      Pupils: Pupils are equal, round, and reactive to light.   Cardiovascular:      Rate and Rhythm: Normal rate and regular rhythm.      Pulses: Normal pulses.      Heart sounds: Normal heart sounds.   Pulmonary:      Effort: Pulmonary effort is normal.      Breath sounds: Normal breath sounds.   Chest:   Breasts:     Jared Score is 5.      Right: Normal.      Left: Normal.   Abdominal:      General: Abdomen is flat. Bowel sounds are normal.      Palpations: Abdomen is soft.   Genitourinary:     General: Normal vulva.      Exam position: Lithotomy position.      Jared stage (genital): 5.      Vagina: Normal.      Cervix: Normal.      Uterus: Normal.       Adnexa: Right adnexa normal and left adnexa normal.   Musculoskeletal:         General: Normal range of motion.      Cervical back: Normal range of motion and neck supple.   Skin:     General: Skin is warm and dry.      Capillary Refill: Capillary refill takes less than 2 seconds.   Neurological:      General: No focal deficit present.      Mental Status: She is alert and oriented to person, place, and time.   Psychiatric:         Mood and Affect: Mood normal.         Behavior: Behavior normal.         Judgment: Judgment normal.               ASSESSMENT/PLAN     (Z00.00) Routine history and physical examination of adult  (primary encounter diagnosis)    Plan: Comprehensive metabolic panel, Hemoglobin A1c,         Pap screen with  HPV - recommended age 30 - 65         years      (F50.9) Eating disorder, unspecified type    Plan: topiramate (TOPAMAX) 50 MG tablet      (F32.81) PMDD (premenstrual dysphoric disorder)    Plan: busPIRone (BUSPAR) 15 MG tablet, busPIRone         (BUSPAR) 10 MG tablet    (E03.8) Subclinical hypothyroidism    Plan: TSH with free T4 reflex      (E78.5) Hyperlipidemia LDL goal <100    Plan: Lipid panel reflex to direct LDL Fasting    -Discussed/Reinforced healthy diety, lifestyle, exercise and safety.  -Recommended completion of routine dental and eye exam.  -Lab screenings completed today. Results pending.          PAP (if applicable): Complete Date of Completion: today Follow-up Recommendation: as needed  CBE (if applicable): Complete Date of Completion: today Follow-up Recommendation: one year  Mammogram (if applicable): Complete Date of Completion: October 2023 Follow-up Recommendation: one year  Colon cancer screening (if applicable): Complete Date of Completion: December 2023 Follow-up Recommendation: as recommended  Cholesterol Screening (if applicable): Complete Date of Completion: today Follow-up Recommendation: as needed  Diabetes Screening (if applicable): Complete Date of Completion: today Follow-up Recommendation: as needed  Thyroid Screening (if applicable): Complete Date of Completion: today Follow-up Recommendation: as needed  Depression Screening (if applicable): Complete Date of Completion: today Follow-up Recommendation: one year    Follow-Up:  Follow up in one year, or sooner if needed.     Patient engaged in their plan of care. Patient verbalized understanding and agreed with the final plan.  AVS printed and given to patient.    Virgen Wen NP   [facility]  Nurse Practitioners [address] [address]  [phone]      Answers for HPI/ROS submitted by the patient on 11/19/2023  If you checked off any problems, how difficult have these  problems made it for you to do your work, take care of things at home, or get along with other people?: Somewhat difficult  PHQ9 TOTAL SCORE: 6  JERONIMO 7 TOTAL SCORE: 7  Frequency of exercise:: 6-7 days/week  Getting at least 3 servings of Calcium per day:: Yes  Diet:: Vegetarian/vegan  Taking medications regularly:: Yes  Medication side effects:: Not applicable  Bi-annual eye exam:: Yes  Dental care twice a year:: Yes  Sleep apnea or symptoms of sleep apnea:: None  Blood in stool: No  heartburn: No  peripheral edema: No  mood changes: No  Skin sensation changes: No  pelvic pain: No  vaginal bleeding: No  vaginal discharge: No  tenderness: No  breast mass: No  breast discharge: No  Additional concerns today:: No  Duration of exercise:: 45-60 minutes

## 2023-11-20 NOTE — NURSING NOTE
"ROOM:1  TYRONE FREEMAN    Preferred Name: Gaviota     How did you hear about us?  Other - Referred by another clinic    43 year old  Chief Complaint   Patient presents with     Physical       Blood pressure 121/68, pulse 62, temperature 97.4  F (36.3  C), temperature source Oral, resp. rate 18, height 1.636 m (5' 4.4\"), weight 50.8 kg (112 lb), SpO2 98%. Body mass index is 18.99 kg/m .  BP completed using cuff size:        Patient Active Problem List   Diagnosis     Amenorrhea     Menses painful       Wt Readings from Last 2 Encounters:   11/20/23 50.8 kg (112 lb)   11/15/22 51.4 kg (113 lb 6.4 oz)     BP Readings from Last 3 Encounters:   11/20/23 121/68   11/15/22 110/67   11/09/21 130/77       Allergies   Allergen Reactions     Cephalosporins Hives       Current Outpatient Medications   Medication     albuterol (PROAIR RESPICLICK) 108 (90 Base) MCG/ACT inhaler     busPIRone (BUSPAR) 10 MG tablet     busPIRone (BUSPAR) 15 MG tablet     fluticasone (FLONASE) 50 MCG/ACT nasal spray     loratadine (CLARITIN) 10 MG tablet     topiramate (TOPAMAX) 50 MG tablet     No current facility-administered medications for this visit.       Social History     Tobacco Use     Smoking status: Never     Smokeless tobacco: Never   Vaping Use     Vaping Use: Never used   Substance Use Topics     Alcohol use: No     Drug use: No       Honoring Choices - Health Care Directive Guide offered to patient at time of visit.    Health Maintenance Due   Topic Date Due     COVID-19 Vaccine (6 - 2023-24 season) 09/01/2023     MAMMO SCREENING  10/05/2023     YEARLY PREVENTIVE VISIT  11/15/2023       Immunization History   Administered Date(s) Administered     COVID-19 Bivalent 18+ (Moderna) 09/09/2022     COVID-19 MONOVALENT 12+ (Pfizer) 01/14/2021, 02/03/2021, 10/13/2021     DTaP, Unspecified 06/04/2015     Influenza (IIV3) PF 11/29/2001, 12/22/2004     Influenza Vaccine >6 months (Alfuria,Fluzone) 09/25/2020       Lab Results   Component " Value Date    PAP NIL 10/10/2019       Recent Labs   Lab Test 11/15/22  0805 10/27/21  0800 11/26/19  0846 10/10/19  0820   * 154*  --  123*   HDL 59 63  --  68   TRIG 87 85  --  49   ALT  --   --   --  20   CR  --   --   --  0.86   GFRESTIMATED  --   --   --  85   GFRESTBLACK  --   --   --  >90   ALBUMIN  --   --   --  4.2   POTASSIUM  --   --   --  3.6   TSH 4.47* 5.52*   < > 4.19*    < > = values in this interval not displayed.           8/28/2023    10:05 AM 5/30/2023    11:56 AM   PHQ-2 ( 1999 Pfizer)   Q1: Little interest or pleasure in doing things 1 1   Q2: Feeling down, depressed or hopeless 1 1   PHQ-2 Score 2 2   Q1: Little interest or pleasure in doing things Several days Several days   Q2: Feeling down, depressed or hopeless Several days Several days   PHQ-2 Score 2 2           6/29/2021     3:52 PM 11/15/2022     7:09 AM 2/6/2023     5:47 PM 11/19/2023     9:35 PM   PHQ-9 SCORE   PHQ-9 Total Score MyChart   15 (Moderately severe depression) 6 (Mild depression)   PHQ-9 Total Score 8 8 15 6           11/15/2022     7:09 AM 2/2/2023     2:17 PM 11/19/2023     9:36 PM   JERONIMO-7 SCORE   Total Score  13 (moderate anxiety) 7 (mild anxiety)   Total Score 9 13 7           10/22/2020     2:51 PM 11/9/2021     8:59 AM   ACT Total Scores   ACT TOTAL SCORE (Goal Greater than or Equal to 20) 25 25   In the past 12 months, how many times did you visit the emergency room for your asthma without being admitted to the hospital? 0 0   In the past 12 months, how many times were you hospitalized overnight because of your asthma? 0 0       Yokasta Trejo, EMT    November 20, 2023 7:54 AM

## 2023-11-20 NOTE — PROGRESS NOTES
Gaviota (pronouns she/her) is a 43 year old adult coming in for a physical and refill on medications. She has no acute complaints. Her asthma is well controlled as she only uses her inhaler on average once a year. Her seasonal allergies and well maintained with Claritin and Flonase. She does live with a cat that could also be contributing. She did mention her history of an eating disorder (bulimia) from [id number] but her medications have been helpful in this and attended treatment at [facility].. Gaviota is up to date on her immunizations. Up to to date with eye exams and dentist. Working on her nutrition and exercises 6 times a week for an average for an hour.

## 2023-11-24 LAB
BKR LAB AP GYN ADEQUACY: NORMAL
BKR LAB AP GYN INTERPRETATION: NORMAL
BKR LAB AP HPV REFLEX: NORMAL
BKR LAB AP LMP: NORMAL
BKR LAB AP PREVIOUS ABNORMAL: NORMAL
PATH REPORT.COMMENTS IMP SPEC: NORMAL
PATH REPORT.COMMENTS IMP SPEC: NORMAL
PATH REPORT.RELEVANT HX SPEC: NORMAL

## 2023-11-27 DIAGNOSIS — E78.5 HYPERLIPIDEMIA LDL GOAL <100: Primary | ICD-10-CM

## 2023-11-27 LAB
HUMAN PAPILLOMA VIRUS 16 DNA: NEGATIVE
HUMAN PAPILLOMA VIRUS 18 DNA: NEGATIVE
HUMAN PAPILLOMA VIRUS FINAL DIAGNOSIS: NORMAL
HUMAN PAPILLOMA VIRUS OTHER HR: NEGATIVE

## 2023-12-11 ENCOUNTER — TRANSFERRED RECORDS (OUTPATIENT)
Dept: HEALTH INFORMATION MANAGEMENT | Facility: CLINIC | Age: 43
End: 2023-12-11
Payer: COMMERCIAL

## 2024-12-21 ENCOUNTER — HEALTH MAINTENANCE LETTER (OUTPATIENT)
Age: 44
End: 2024-12-21

## 2025-01-25 ENCOUNTER — HEALTH MAINTENANCE LETTER (OUTPATIENT)
Age: 45
End: 2025-01-25